# Patient Record
Sex: FEMALE | Race: WHITE | NOT HISPANIC OR LATINO | URBAN - METROPOLITAN AREA
[De-identification: names, ages, dates, MRNs, and addresses within clinical notes are randomized per-mention and may not be internally consistent; named-entity substitution may affect disease eponyms.]

---

## 2023-02-09 PROBLEM — R26.89 POOR BALANCE: Status: ACTIVE | Noted: 2023-02-09

## 2023-02-09 PROBLEM — R39.9 UTI SYMPTOMS: Status: ACTIVE | Noted: 2023-02-09

## 2023-02-09 PROBLEM — G47.33 OBSTRUCTIVE SLEEP APNEA OF ADULT: Status: ACTIVE | Noted: 2023-02-09

## 2023-02-09 PROBLEM — R06.83 SNORING: Status: ACTIVE | Noted: 2023-02-09

## 2023-02-09 PROBLEM — R20.2 TINGLING: Status: ACTIVE | Noted: 2023-02-09

## 2023-02-09 PROBLEM — R56.9 SEIZURES (MULTI): Status: ACTIVE | Noted: 2023-02-09

## 2023-02-09 PROBLEM — G24.01 TARDIVE DYSKINESIA: Status: ACTIVE | Noted: 2023-02-09

## 2023-02-09 PROBLEM — F32.0 DEPRESSION, MAJOR, SINGLE EPISODE, MILD (CMS-HCC): Status: ACTIVE | Noted: 2023-02-09

## 2023-02-09 PROBLEM — G47.30 SLEEP APNEA: Status: ACTIVE | Noted: 2023-02-09

## 2023-02-09 PROBLEM — F32.A DEPRESSION: Status: ACTIVE | Noted: 2023-02-09

## 2023-02-09 PROBLEM — G25.71 AKATHISIA: Status: ACTIVE | Noted: 2023-02-09

## 2023-02-09 PROBLEM — N32.81 OVERACTIVE BLADDER: Status: ACTIVE | Noted: 2023-02-09

## 2023-02-09 PROBLEM — R21 RASH: Status: ACTIVE | Noted: 2023-02-09

## 2023-02-09 PROBLEM — G25.9 MOVEMENT DISORDER: Status: ACTIVE | Noted: 2023-02-09

## 2023-02-09 PROBLEM — E11.9 TYPE 2 DIABETES MELLITUS (MULTI): Status: ACTIVE | Noted: 2023-02-09

## 2023-02-09 PROBLEM — I83.90 VARICOSE VEIN OF LEG: Status: ACTIVE | Noted: 2023-02-09

## 2023-02-09 PROBLEM — K59.09 CHRONIC CONSTIPATION: Status: ACTIVE | Noted: 2023-02-09

## 2023-02-09 PROBLEM — R60.9 PERIPHERAL EDEMA: Status: ACTIVE | Noted: 2023-02-09

## 2023-02-09 PROBLEM — R60.0 PERIPHERAL EDEMA: Status: ACTIVE | Noted: 2023-02-09

## 2023-02-09 PROBLEM — L03.119 CELLULITIS OF HAND: Status: ACTIVE | Noted: 2023-02-09

## 2023-02-09 PROBLEM — K02.9 PAIN DUE TO DENTAL CARIES: Status: ACTIVE | Noted: 2023-02-09

## 2023-02-09 PROBLEM — M10.9: Status: ACTIVE | Noted: 2023-02-09

## 2023-02-09 PROBLEM — G47.00 INSOMNIA: Status: ACTIVE | Noted: 2023-02-09

## 2023-02-09 PROBLEM — R92.8 ABNORMAL MAMMOGRAM: Status: ACTIVE | Noted: 2023-02-09

## 2023-02-09 PROBLEM — M25.579 ANKLE PAIN: Status: ACTIVE | Noted: 2023-02-09

## 2023-02-09 PROBLEM — G47.10 EXCESSIVE SLEEPINESS: Status: ACTIVE | Noted: 2023-02-09

## 2023-02-09 PROBLEM — I10 HTN (HYPERTENSION): Status: ACTIVE | Noted: 2023-02-09

## 2023-02-09 RX ORDER — SPIRONOLACTONE 25 MG/1
1 TABLET ORAL
COMMUNITY
Start: 2018-12-13 | End: 2023-08-17 | Stop reason: ALTCHOICE

## 2023-02-09 RX ORDER — FLUTICASONE PROPIONATE 50 MCG
2 SPRAY, SUSPENSION (ML) NASAL DAILY
COMMUNITY
Start: 2018-01-08

## 2023-02-09 RX ORDER — MELATONIN 3 MG
TABLET ORAL
COMMUNITY
End: 2023-09-07 | Stop reason: SDUPTHER

## 2023-02-09 RX ORDER — AMLODIPINE BESYLATE 5 MG/1
1 TABLET ORAL
COMMUNITY
Start: 2021-06-09 | End: 2023-09-07 | Stop reason: SDUPTHER

## 2023-02-09 RX ORDER — MULTIVITAMIN
TABLET ORAL
COMMUNITY

## 2023-02-09 RX ORDER — GLIMEPIRIDE 1 MG/1
TABLET ORAL 2 TIMES DAILY
COMMUNITY
Start: 2020-03-06 | End: 2023-05-15 | Stop reason: SDUPTHER

## 2023-02-09 RX ORDER — DICLOFENAC SODIUM 10 MG/G
GEL TOPICAL
COMMUNITY
End: 2023-10-18 | Stop reason: ALTCHOICE

## 2023-02-09 RX ORDER — LOSARTAN POTASSIUM 100 MG/1
50 TABLET ORAL DAILY
COMMUNITY
Start: 2022-01-21 | End: 2023-08-16 | Stop reason: SDUPTHER

## 2023-02-09 RX ORDER — BLOOD SUGAR DIAGNOSTIC
STRIP MISCELLANEOUS EVERY 12 HOURS
COMMUNITY
Start: 2020-03-06

## 2023-02-09 RX ORDER — LEVETIRACETAM 1000 MG/1
1 TABLET ORAL 2 TIMES DAILY
COMMUNITY
End: 2023-08-16 | Stop reason: ALTCHOICE

## 2023-02-09 RX ORDER — FUROSEMIDE 20 MG/1
1 TABLET ORAL DAILY
COMMUNITY
Start: 2022-04-28

## 2023-02-09 RX ORDER — CICLOPIROX 80 MG/ML
SOLUTION TOPICAL
COMMUNITY
End: 2023-10-18 | Stop reason: ALTCHOICE

## 2023-02-09 RX ORDER — OXYBUTYNIN CHLORIDE 10 MG/1
1 TABLET, EXTENDED RELEASE ORAL DAILY
COMMUNITY
Start: 2020-09-15 | End: 2023-03-23 | Stop reason: SDUPTHER

## 2023-02-09 RX ORDER — NYSTATIN 100000 U/G
CREAM TOPICAL
COMMUNITY
Start: 2020-02-14

## 2023-02-09 RX ORDER — METFORMIN HYDROCHLORIDE 1000 MG/1
1 TABLET ORAL 2 TIMES DAILY
COMMUNITY
End: 2023-05-22 | Stop reason: SDUPTHER

## 2023-02-09 RX ORDER — METOPROLOL TARTRATE 25 MG/1
1 TABLET, FILM COATED ORAL 2 TIMES DAILY
COMMUNITY
Start: 2022-01-21 | End: 2023-04-28 | Stop reason: SDUPTHER

## 2023-02-09 RX ORDER — BENAZEPRIL HYDROCHLORIDE 20 MG/1
1 TABLET ORAL 2 TIMES DAILY
COMMUNITY
Start: 2020-04-24 | End: 2023-04-03 | Stop reason: SDUPTHER

## 2023-02-09 RX ORDER — ALLOPURINOL 300 MG/1
1 TABLET ORAL
COMMUNITY
Start: 2018-10-22 | End: 2023-04-28 | Stop reason: SDUPTHER

## 2023-02-09 RX ORDER — BETAMETHASONE DIPROPIONATE 0.5 MG/G
CREAM TOPICAL
COMMUNITY
Start: 2021-07-12 | End: 2023-10-18 | Stop reason: ALTCHOICE

## 2023-02-09 RX ORDER — LAMOTRIGINE 150 MG/1
1 TABLET ORAL 2 TIMES DAILY
COMMUNITY
Start: 2018-10-15 | End: 2024-01-10 | Stop reason: ALTCHOICE

## 2023-02-09 RX ORDER — LEVOTHYROXINE SODIUM 25 UG/1
1 TABLET ORAL DAILY
COMMUNITY
End: 2024-04-04 | Stop reason: SDUPTHER

## 2023-02-09 RX ORDER — MELOXICAM 15 MG/1
TABLET ORAL
COMMUNITY
End: 2023-08-17 | Stop reason: SINTOL

## 2023-02-09 RX ORDER — ATORVASTATIN CALCIUM 20 MG/1
1 TABLET, FILM COATED ORAL DAILY
COMMUNITY
End: 2023-08-10 | Stop reason: SDUPTHER

## 2023-02-09 RX ORDER — POLYETHYLENE GLYCOL 3350 17 G/17G
POWDER, FOR SOLUTION ORAL
COMMUNITY
Start: 2019-09-09

## 2023-03-23 ENCOUNTER — OFFICE VISIT (OUTPATIENT)
Dept: PRIMARY CARE | Facility: CLINIC | Age: 73
End: 2023-03-23
Payer: MEDICARE

## 2023-03-23 VITALS
DIASTOLIC BLOOD PRESSURE: 84 MMHG | TEMPERATURE: 97.8 F | RESPIRATION RATE: 20 BRPM | WEIGHT: 203 LBS | OXYGEN SATURATION: 96 % | HEIGHT: 64 IN | BODY MASS INDEX: 34.66 KG/M2 | HEART RATE: 76 BPM | SYSTOLIC BLOOD PRESSURE: 134 MMHG

## 2023-03-23 DIAGNOSIS — Z00.00 MEDICARE ANNUAL WELLNESS VISIT, SUBSEQUENT: Primary | ICD-10-CM

## 2023-03-23 DIAGNOSIS — R56.9 SEIZURES (MULTI): ICD-10-CM

## 2023-03-23 DIAGNOSIS — Z12.31 ENCOUNTER FOR SCREENING MAMMOGRAM FOR MALIGNANT NEOPLASM OF BREAST: ICD-10-CM

## 2023-03-23 DIAGNOSIS — M10.9 GOUT, UNSPECIFIED CAUSE, UNSPECIFIED CHRONICITY, UNSPECIFIED SITE: ICD-10-CM

## 2023-03-23 DIAGNOSIS — R26.89 BALANCE DISORDER: ICD-10-CM

## 2023-03-23 DIAGNOSIS — E11.9 TYPE 2 DIABETES MELLITUS WITHOUT COMPLICATION, WITHOUT LONG-TERM CURRENT USE OF INSULIN (MULTI): ICD-10-CM

## 2023-03-23 DIAGNOSIS — N32.81 OVERACTIVE BLADDER: ICD-10-CM

## 2023-03-23 DIAGNOSIS — Z12.11 COLON CANCER SCREENING: ICD-10-CM

## 2023-03-23 DIAGNOSIS — F32.0 DEPRESSION, MAJOR, SINGLE EPISODE, MILD (CMS-HCC): ICD-10-CM

## 2023-03-23 DIAGNOSIS — G47.33 OBSTRUCTIVE SLEEP APNEA OF ADULT: ICD-10-CM

## 2023-03-23 PROCEDURE — 3075F SYST BP GE 130 - 139MM HG: CPT | Performed by: FAMILY MEDICINE

## 2023-03-23 PROCEDURE — 1170F FXNL STATUS ASSESSED: CPT | Performed by: FAMILY MEDICINE

## 2023-03-23 PROCEDURE — 1036F TOBACCO NON-USER: CPT | Performed by: FAMILY MEDICINE

## 2023-03-23 PROCEDURE — 4010F ACE/ARB THERAPY RXD/TAKEN: CPT | Performed by: FAMILY MEDICINE

## 2023-03-23 PROCEDURE — 3051F HG A1C>EQUAL 7.0%<8.0%: CPT | Performed by: FAMILY MEDICINE

## 2023-03-23 PROCEDURE — G0439 PPPS, SUBSEQ VISIT: HCPCS | Performed by: FAMILY MEDICINE

## 2023-03-23 PROCEDURE — 99214 OFFICE O/P EST MOD 30 MIN: CPT | Performed by: FAMILY MEDICINE

## 2023-03-23 PROCEDURE — 3079F DIAST BP 80-89 MM HG: CPT | Performed by: FAMILY MEDICINE

## 2023-03-23 PROCEDURE — 1159F MED LIST DOCD IN RCRD: CPT | Performed by: FAMILY MEDICINE

## 2023-03-23 RX ORDER — OXYBUTYNIN CHLORIDE 15 MG/1
15 TABLET, EXTENDED RELEASE ORAL DAILY
Qty: 30 TABLET | Refills: 11 | Status: SHIPPED | OUTPATIENT
Start: 2023-03-23 | End: 2023-04-28 | Stop reason: SDUPTHER

## 2023-03-23 RX ORDER — ACETAMINOPHEN 500 MG
TABLET ORAL 2 TIMES DAILY
COMMUNITY
End: 2023-10-18 | Stop reason: ALTCHOICE

## 2023-03-23 RX ORDER — CHOLECALCIFEROL (VITAMIN D3) 50 MCG
2000 TABLET ORAL
COMMUNITY
End: 2023-10-18 | Stop reason: ALTCHOICE

## 2023-03-23 RX ORDER — MULTIVITAMIN
1 TABLET ORAL DAILY
COMMUNITY

## 2023-03-23 RX ORDER — OMEGA-3 FATTY ACIDS 1000 MG
1 CAPSULE ORAL
COMMUNITY
End: 2023-10-18 | Stop reason: ALTCHOICE

## 2023-03-23 RX ORDER — ACETAMINOPHEN, DEXTROMETHORPHAN HBR, DOXYLAMINE SUCCINATE, PHENYLEPHRINE HCL 650; 20; 12.5; 1 MG/30ML; MG/30ML; MG/30ML; MG/30ML
SOLUTION ORAL
COMMUNITY
End: 2023-10-18 | Stop reason: ALTCHOICE

## 2023-03-23 ASSESSMENT — ENCOUNTER SYMPTOMS
FEVER: 0
ENDOCRINE NEGATIVE: 1
DIZZINESS: 0
RESPIRATORY NEGATIVE: 1
BACK PAIN: 1
OCCASIONAL FEELINGS OF UNSTEADINESS: 0
FREQUENCY: 1
MYALGIAS: 1
GASTROINTESTINAL NEGATIVE: 1
LOSS OF SENSATION IN FEET: 1
HEMATOLOGIC/LYMPHATIC NEGATIVE: 1
DIFFICULTY URINATING: 1
PSYCHIATRIC NEGATIVE: 1
DEPRESSION: 0
APPETITE CHANGE: 0
ARTHRALGIAS: 1
CARDIOVASCULAR NEGATIVE: 1
NEUROLOGICAL NEGATIVE: 1
EYES NEGATIVE: 1
ALLERGIC/IMMUNOLOGIC NEGATIVE: 1

## 2023-03-23 ASSESSMENT — ACTIVITIES OF DAILY LIVING (ADL)
MANAGING_FINANCES: INDEPENDENT
TAKING_MEDICATION: INDEPENDENT
DOING_HOUSEWORK: INDEPENDENT
BATHING: INDEPENDENT
GROCERY_SHOPPING: INDEPENDENT
DRESSING: INDEPENDENT

## 2023-03-23 ASSESSMENT — PATIENT HEALTH QUESTIONNAIRE - PHQ9
10. IF YOU CHECKED OFF ANY PROBLEMS, HOW DIFFICULT HAVE THESE PROBLEMS MADE IT FOR YOU TO DO YOUR WORK, TAKE CARE OF THINGS AT HOME, OR GET ALONG WITH OTHER PEOPLE: SOMEWHAT DIFFICULT
2. FEELING DOWN, DEPRESSED OR HOPELESS: SEVERAL DAYS
SUM OF ALL RESPONSES TO PHQ9 QUESTIONS 1 AND 2: 2
1. LITTLE INTEREST OR PLEASURE IN DOING THINGS: SEVERAL DAYS

## 2023-03-23 NOTE — PROGRESS NOTES
"Subjective   Reason for Visit: Aracely Benito is an 73 y.o. female here for a Medicare Wellness visit.     Past Medical, Surgical, and Family History reviewed and updated in chart.    Reviewed all medications by prescribing practitioner or clinical pharmacist (such as prescriptions, OTCs, herbal therapies and supplements) and documented in the medical record.    HPI    Patient Care Team:  Pablo Estrella DO as PCP - General  Pablo Estrella DO as PCP - [facility] Attributed Provider     Review of Systems   Constitutional:  Negative for appetite change and fever.   HENT: Negative.     Eyes: Negative.    Respiratory: Negative.     Cardiovascular: Negative.    Gastrointestinal: Negative.    Endocrine: Negative.    Genitourinary:  Positive for difficulty urinating and frequency.   Musculoskeletal:  Positive for arthralgias, back pain and myalgias.        Leg cramps   Skin: Negative.    Allergic/Immunologic: Negative.    Neurological: Negative.  Negative for dizziness.        Gait difficulty   Hematological: Negative.    Psychiatric/Behavioral: Negative.         Objective   Vitals:  /84 (BP Location: Left arm, Patient Position: Sitting)   Pulse 76   Temp 36.6 °C (97.8 °F)   Resp 20   Ht 1.613 m (5' 3.5\")   Wt 92.1 kg (203 lb)   SpO2 96%   BMI 35.40 kg/m²       Physical Exam  Vitals and nursing note reviewed.   Constitutional:       Appearance: Normal appearance.   HENT:      Head: Normocephalic and atraumatic.      Nose: Nose normal.      Mouth/Throat:      Pharynx: Oropharynx is clear.   Eyes:      Extraocular Movements: Extraocular movements intact.      Conjunctiva/sclera: Conjunctivae normal.      Pupils: Pupils are equal, round, and reactive to light.   Neck:      Vascular: No carotid bruit.   Cardiovascular:      Rate and Rhythm: Normal rate and regular rhythm.      Pulses: Normal pulses.      Heart sounds: Normal heart sounds.   Pulmonary:      Effort: Pulmonary effort is normal. No respiratory distress.      " Breath sounds: Normal breath sounds. No wheezing, rhonchi or rales.   Abdominal:      General: Abdomen is flat. Bowel sounds are normal. There is no distension.      Palpations: Abdomen is soft.      Tenderness: There is no abdominal tenderness.      Hernia: No hernia is present.   Musculoskeletal:         General: No swelling or tenderness. Normal range of motion.      Cervical back: Normal range of motion and neck supple. No tenderness.   Lymphadenopathy:      Cervical: No cervical adenopathy.   Skin:     General: Skin is warm and dry.      Capillary Refill: Capillary refill takes less than 2 seconds.   Neurological:      General: No focal deficit present.      Mental Status: She is alert and oriented to person, place, and time.      Cranial Nerves: No cranial nerve deficit.   Psychiatric:         Attention and Perception: Attention and perception normal.         Mood and Affect: Mood normal.         Behavior: Behavior normal.         Thought Content: Thought content normal.         Judgment: Judgment normal.         Assessment/Plan   1. Medicare annual wellness visit, subsequent      2. Depression, major, single episode, mild (CMS/HCC)      3. Type 2 diabetes mellitus without complication, without long-term current use of insulin (CMS/[facility])    - Albumin , Urine Random; Future  - CBC; Future  - Comprehensive Metabolic Panel; Future  - Hemoglobin A1C; Future  - Lipid Panel; Future  - Thyroid Stimulating Hormone; Future  - Thyroxine, Total; Future    4. Obstructive sleep apnea of adult      5. Gout, unspecified cause, unspecified chronicity, unspecified site    - Uric Acid; Future    6. Balance disorder    - Referral to Physical Therapy; Future    7. Encounter for screening mammogram for malignant neoplasm of breast    - BI mammo bilateral screening tomosynthesis; Future    8. Colon cancer screening    - Cologuard® colon cancer screening; Future  - Cologuard® colon cancer screening    9. Overactive bladder    -  oxybutynin XL (Ditropan-XL) 15 mg 24 hr tablet; Take 1 tablet (15 mg) by mouth once daily.  Dispense: 30 tablet; Refill: 11   Problem List Items Addressed This Visit    None

## 2023-04-03 ENCOUNTER — LAB (OUTPATIENT)
Dept: LAB | Facility: LAB | Age: 73
End: 2023-04-03
Payer: MEDICARE

## 2023-04-03 DIAGNOSIS — I10 HYPERTENSION, UNSPECIFIED TYPE: ICD-10-CM

## 2023-04-03 DIAGNOSIS — M10.9 GOUT, UNSPECIFIED CAUSE, UNSPECIFIED CHRONICITY, UNSPECIFIED SITE: ICD-10-CM

## 2023-04-03 DIAGNOSIS — E11.9 TYPE 2 DIABETES MELLITUS WITHOUT COMPLICATION, WITHOUT LONG-TERM CURRENT USE OF INSULIN (MULTI): ICD-10-CM

## 2023-04-03 LAB
ALANINE AMINOTRANSFERASE (SGPT) (U/L) IN SER/PLAS: 16 U/L (ref 7–45)
ALBUMIN (G/DL) IN SER/PLAS: 3.9 G/DL (ref 3.4–5)
ALKALINE PHOSPHATASE (U/L) IN SER/PLAS: 65 U/L (ref 33–136)
ANION GAP IN SER/PLAS: 12 MMOL/L (ref 10–20)
ASPARTATE AMINOTRANSFERASE (SGOT) (U/L) IN SER/PLAS: 15 U/L (ref 9–39)
BILIRUBIN TOTAL (MG/DL) IN SER/PLAS: 0.3 MG/DL (ref 0–1.2)
CALCIUM (MG/DL) IN SER/PLAS: 9.3 MG/DL (ref 8.6–10.3)
CARBON DIOXIDE, TOTAL (MMOL/L) IN SER/PLAS: 25 MMOL/L (ref 21–32)
CHLORIDE (MMOL/L) IN SER/PLAS: 108 MMOL/L (ref 98–107)
CHOLESTEROL (MG/DL) IN SER/PLAS: 162 MG/DL (ref 0–199)
CHOLESTEROL IN HDL (MG/DL) IN SER/PLAS: 34.7 MG/DL
CHOLESTEROL/HDL RATIO: 4.7
CREATININE (MG/DL) IN SER/PLAS: 1.89 MG/DL (ref 0.5–1.05)
ERYTHROCYTE DISTRIBUTION WIDTH (RATIO) BY AUTOMATED COUNT: 14.9 % (ref 11.5–14.5)
ERYTHROCYTE MEAN CORPUSCULAR HEMOGLOBIN CONCENTRATION (G/DL) BY AUTOMATED: 31.3 G/DL (ref 32–36)
ERYTHROCYTE MEAN CORPUSCULAR VOLUME (FL) BY AUTOMATED COUNT: 99 FL (ref 80–100)
ERYTHROCYTES (10*6/UL) IN BLOOD BY AUTOMATED COUNT: 3.23 X10E12/L (ref 4–5.2)
ESTIMATED AVERAGE GLUCOSE FOR HBA1C: 160 MG/DL
GFR FEMALE: 28 ML/MIN/1.73M2
GLUCOSE (MG/DL) IN SER/PLAS: 133 MG/DL (ref 74–99)
HEMATOCRIT (%) IN BLOOD BY AUTOMATED COUNT: 32 % (ref 36–46)
HEMOGLOBIN (G/DL) IN BLOOD: 10 G/DL (ref 12–16)
HEMOGLOBIN A1C/HEMOGLOBIN TOTAL IN BLOOD: 7.2 %
LDL: 56 MG/DL (ref 0–99)
LEUKOCYTES (10*3/UL) IN BLOOD BY AUTOMATED COUNT: 7.9 X10E9/L (ref 4.4–11.3)
NON HDL CHOLESTEROL: 127 MG/DL
PLATELETS (10*3/UL) IN BLOOD AUTOMATED COUNT: 210 X10E9/L (ref 150–450)
POTASSIUM (MMOL/L) IN SER/PLAS: 5.1 MMOL/L (ref 3.5–5.3)
PROTEIN TOTAL: 6 G/DL (ref 6.4–8.2)
SODIUM (MMOL/L) IN SER/PLAS: 140 MMOL/L (ref 136–145)
THYROTROPIN (MIU/L) IN SER/PLAS BY DETECTION LIMIT <= 0.05 MIU/L: 2.76 MIU/L (ref 0.44–3.98)
THYROXINE (T4) (UG/DL) IN SER/PLAS: 7.2 UG/DL (ref 4.5–11.1)
TRIGLYCERIDE (MG/DL) IN SER/PLAS: 357 MG/DL (ref 0–149)
URATE (MG/DL) IN SER/PLAS: 4.2 MG/DL (ref 2.3–6.7)
UREA NITROGEN (MG/DL) IN SER/PLAS: 47 MG/DL (ref 6–23)
VLDL: 71 MG/DL (ref 0–40)

## 2023-04-03 PROCEDURE — 85027 COMPLETE CBC AUTOMATED: CPT

## 2023-04-03 PROCEDURE — 80061 LIPID PANEL: CPT

## 2023-04-03 PROCEDURE — 83036 HEMOGLOBIN GLYCOSYLATED A1C: CPT

## 2023-04-03 PROCEDURE — 80053 COMPREHEN METABOLIC PANEL: CPT

## 2023-04-03 PROCEDURE — 36415 COLL VENOUS BLD VENIPUNCTURE: CPT

## 2023-04-03 PROCEDURE — 84550 ASSAY OF BLOOD/URIC ACID: CPT

## 2023-04-03 PROCEDURE — 84436 ASSAY OF TOTAL THYROXINE: CPT

## 2023-04-03 PROCEDURE — 84443 ASSAY THYROID STIM HORMONE: CPT

## 2023-04-03 RX ORDER — BENAZEPRIL HYDROCHLORIDE 20 MG/1
20 TABLET ORAL 2 TIMES DAILY
Qty: 60 TABLET | Refills: 3 | Status: SHIPPED | OUTPATIENT
Start: 2023-04-03 | End: 2023-08-16 | Stop reason: SINTOL

## 2023-04-03 NOTE — TELEPHONE ENCOUNTER
PT WOULD ALSO LIKE ORDER FOR MAMMOGRAM.  Requested Prescriptions     Pending Prescriptions Disp Refills    benazepril (Lotensin) 20 mg tablet 60 tablet 3     Sig: Take 1 tablet (20 mg) by mouth in the morning and 1 tablet (20 mg) before bedtime.

## 2023-04-03 NOTE — RESULT ENCOUNTER NOTE
call pt Their Triglycerides were high I would suggest OTC fish oil 1200mg 2 once a day to help lower triglycerides and boost HDL.

## 2023-04-04 NOTE — RESULT ENCOUNTER NOTE
Also her A1C went down to 7.2.   Her kidney function was a little elevated. This could have been exacerbated by her fasting. I want her to stay well hydrated

## 2023-04-05 ENCOUNTER — TELEPHONE (OUTPATIENT)
Dept: PRIMARY CARE | Facility: CLINIC | Age: 73
End: 2023-04-05
Payer: MEDICARE

## 2023-04-05 NOTE — TELEPHONE ENCOUNTER
----- Message from Pablo Estrella DO sent at 4/3/2023  9:00 PM EDT -----  Also her A1C went down to 7.2.   Her kidney function was a little elevated. This could have been exacerbated by her fasting. I want her to stay well hydrated

## 2023-04-05 NOTE — TELEPHONE ENCOUNTER
----- Message from Pablo Estrella DO sent at 4/3/2023  6:38 PM EDT -----  call pt Their Triglycerides were high I would suggest OTC fish oil 1200mg 2 once a day to help lower triglycerides and boost HDL.

## 2023-04-05 NOTE — TELEPHONE ENCOUNTER
----- Message from Pablo Estrella DO sent at 4/3/2023  6:15 PM EDT -----  Please call the patient regarding her abnormal result.  Her Iron was a little low I would suggest Iron 325 mg once daily

## 2023-04-17 DIAGNOSIS — R93.1 ABNORMAL ECHOCARDIOGRAM: Primary | ICD-10-CM

## 2023-04-18 ENCOUNTER — TELEPHONE (OUTPATIENT)
Dept: PRIMARY CARE | Facility: CLINIC | Age: 73
End: 2023-04-18
Payer: MEDICARE

## 2023-04-18 NOTE — TELEPHONE ENCOUNTER
Pt called and is requesting an order for a mammogram. She would also like a recommendation for a cardiologist

## 2023-04-20 ENCOUNTER — TELEPHONE (OUTPATIENT)
Dept: PRIMARY CARE | Facility: CLINIC | Age: 73
End: 2023-04-20
Payer: MEDICARE

## 2023-04-20 NOTE — TELEPHONE ENCOUNTER
----- Message from Pablo Estrella DO sent at 4/17/2023  1:00 PM EDT -----  Regarding: FW: cardiologist  Iput in a referral to Dr Cedeño  ----- Message -----  From: Tracey Lockett CMA  Sent: 4/5/2023   3:38 PM EDT  To: Pablo Estrella DO  Subject: cardiologist                                     Spoke with patient and she is aware of results. She has never seen a cardiologist, who do you recommend    ----- Message -----  From: Pablo Estrella DO  Sent: 4/3/2023   6:16 PM EDT  To: Tracey Lockett CMA    Please call the patient regarding her abnormal result.  Call pther venticle is not filling as effectively as it should and her mitral valve has some calcification. Has she seen cardiology in the past?

## 2023-04-28 DIAGNOSIS — M10.9 GOUT, UNSPECIFIED CAUSE, UNSPECIFIED CHRONICITY, UNSPECIFIED SITE: Primary | ICD-10-CM

## 2023-04-28 DIAGNOSIS — I10 HYPERTENSION, UNSPECIFIED TYPE: ICD-10-CM

## 2023-04-28 DIAGNOSIS — N32.81 OVERACTIVE BLADDER: ICD-10-CM

## 2023-04-28 RX ORDER — OXYBUTYNIN CHLORIDE 15 MG/1
15 TABLET, EXTENDED RELEASE ORAL DAILY
Qty: 30 TABLET | Refills: 3 | Status: SHIPPED | OUTPATIENT
Start: 2023-04-28 | End: 2023-08-26

## 2023-04-28 RX ORDER — METOPROLOL TARTRATE 25 MG/1
25 TABLET, FILM COATED ORAL 2 TIMES DAILY
Qty: 60 TABLET | Refills: 3 | Status: SHIPPED | OUTPATIENT
Start: 2023-04-28 | End: 2023-08-16 | Stop reason: ALTCHOICE

## 2023-04-28 RX ORDER — ALLOPURINOL 300 MG/1
300 TABLET ORAL
Qty: 30 TABLET | Refills: 3 | Status: SHIPPED | OUTPATIENT
Start: 2023-04-28 | End: 2023-08-17 | Stop reason: SDUPTHER

## 2023-04-28 NOTE — TELEPHONE ENCOUNTER
Requested Prescriptions     Pending Prescriptions Disp Refills    oxybutynin XL (Ditropan-XL) 15 mg 24 hr tablet 30 tablet 11     Sig: Take 1 tablet (15 mg) by mouth once daily.    metoprolol tartrate (Lopressor) 25 mg tablet 60 tablet 3     Sig: Take 1 tablet (25 mg) by mouth 2 times a day.    allopurinol (Zyloprim) 300 mg tablet 30 tablet 0     Sig: Take 1 tablet (300 mg) by mouth once every day.     FYI pop up came up for Allopurinol for kidney disease.

## 2023-05-04 LAB
ANION GAP IN SER/PLAS: 16 MMOL/L (ref 10–20)
CALCIUM (MG/DL) IN SER/PLAS: 9.5 MG/DL (ref 8.6–10.3)
CARBON DIOXIDE, TOTAL (MMOL/L) IN SER/PLAS: 22 MMOL/L (ref 21–32)
CHLORIDE (MMOL/L) IN SER/PLAS: 105 MMOL/L (ref 98–107)
CREATININE (MG/DL) IN SER/PLAS: 1.83 MG/DL (ref 0.5–1.05)
ERYTHROCYTE DISTRIBUTION WIDTH (RATIO) BY AUTOMATED COUNT: 14.5 % (ref 11.5–14.5)
ERYTHROCYTE MEAN CORPUSCULAR HEMOGLOBIN CONCENTRATION (G/DL) BY AUTOMATED: 30.6 G/DL (ref 32–36)
ERYTHROCYTE MEAN CORPUSCULAR VOLUME (FL) BY AUTOMATED COUNT: 97 FL (ref 80–100)
ERYTHROCYTES (10*6/UL) IN BLOOD BY AUTOMATED COUNT: 3.53 X10E12/L (ref 4–5.2)
GFR FEMALE: 29 ML/MIN/1.73M2
GLUCOSE (MG/DL) IN SER/PLAS: 140 MG/DL (ref 74–99)
HEMATOCRIT (%) IN BLOOD BY AUTOMATED COUNT: 34.3 % (ref 36–46)
HEMOGLOBIN (G/DL) IN BLOOD: 10.5 G/DL (ref 12–16)
LEUKOCYTES (10*3/UL) IN BLOOD BY AUTOMATED COUNT: 8.4 X10E9/L (ref 4.4–11.3)
NRBC (PER 100 WBCS) BY AUTOMATED COUNT: 0 /100 WBC (ref 0–0)
PLATELETS (10*3/UL) IN BLOOD AUTOMATED COUNT: 199 X10E9/L (ref 150–450)
POTASSIUM (MMOL/L) IN SER/PLAS: 4.8 MMOL/L (ref 3.5–5.3)
SODIUM (MMOL/L) IN SER/PLAS: 138 MMOL/L (ref 136–145)
UREA NITROGEN (MG/DL) IN SER/PLAS: 39 MG/DL (ref 6–23)

## 2023-05-15 ENCOUNTER — TELEPHONE (OUTPATIENT)
Dept: PRIMARY CARE | Facility: CLINIC | Age: 73
End: 2023-05-15
Payer: MEDICARE

## 2023-05-15 DIAGNOSIS — E11.9 TYPE 2 DIABETES MELLITUS WITHOUT COMPLICATION, WITHOUT LONG-TERM CURRENT USE OF INSULIN (MULTI): Primary | ICD-10-CM

## 2023-05-15 RX ORDER — GLIMEPIRIDE 1 MG/1
1 TABLET ORAL DAILY
Qty: 90 TABLET | Refills: 3 | Status: SHIPPED | OUTPATIENT
Start: 2023-05-15 | End: 2023-08-16 | Stop reason: SINTOL

## 2023-05-15 NOTE — TELEPHONE ENCOUNTER
Requested Prescriptions     Pending Prescriptions Disp Refills    glimepiride (Amaryl) 1 mg tablet 90 tablet 1     Sig: Take 1 tablet (1 mg) by mouth once daily.

## 2023-05-22 ENCOUNTER — TELEPHONE (OUTPATIENT)
Dept: PEDIATRICS | Facility: CLINIC | Age: 73
End: 2023-05-22
Payer: MEDICARE

## 2023-05-22 DIAGNOSIS — E11.9 TYPE 2 DIABETES MELLITUS WITHOUT COMPLICATION, WITHOUT LONG-TERM CURRENT USE OF INSULIN (MULTI): Primary | ICD-10-CM

## 2023-05-22 NOTE — TELEPHONE ENCOUNTER
Requested Prescriptions     Pending Prescriptions Disp Refills    metFORMIN (Glucophage) 1,000 mg tablet 180 tablet 2     Sig: Take 1 tablet (1,000 mg) by mouth 2 times a day.

## 2023-05-23 RX ORDER — METFORMIN HYDROCHLORIDE 1000 MG/1
1000 TABLET ORAL 2 TIMES DAILY
Qty: 180 TABLET | Refills: 3 | Status: SHIPPED | OUTPATIENT
Start: 2023-05-23 | End: 2023-08-16 | Stop reason: ALTCHOICE

## 2023-08-09 ENCOUNTER — TELEPHONE (OUTPATIENT)
Dept: PRIMARY CARE | Facility: CLINIC | Age: 73
End: 2023-08-09
Payer: MEDICARE

## 2023-08-09 ENCOUNTER — PATIENT OUTREACH (OUTPATIENT)
Dept: PRIMARY CARE | Facility: CLINIC | Age: 73
End: 2023-08-09
Payer: MEDICARE

## 2023-08-09 RX ORDER — CARVEDILOL 12.5 MG/1
12.5 TABLET ORAL
COMMUNITY
End: 2023-09-07 | Stop reason: SDUPTHER

## 2023-08-09 RX ORDER — HYDRALAZINE HYDROCHLORIDE 100 MG/1
100 TABLET, FILM COATED ORAL EVERY 8 HOURS
COMMUNITY

## 2023-08-09 NOTE — TELEPHONE ENCOUNTER
Kamini with [facility] home care calling. Wants to know if you will follow pts home care services after being discharged from hospital today.  [phone]  Elba from [facility] also calling ([phone]) They received skilled nursing and therapy orders. They want to confirm that you will continue to follow pt for future healthcare needs.

## 2023-08-09 NOTE — PROGRESS NOTES
Discharge Facility:[facility]  Discharge Diagnosis:hypertensive urgency and worsening kidney function > CKD4  Admission Date:8/1/2023  Discharge Date: 8/8/2023    PCP Appointment Date:NONE  Specialist Appointment Date: DR ELDRIDGE 8/25/2023  Hospital Encounter and Summary: Linked   See discharge assessment below for further details    Engagement  Call Start Time: 1044 (8/9/2023 10:43 AM)    Medications  Medications reviewed with patient/caregiver?: Yes (8/9/2023 10:43 AM)  Is the patient having any side effects they believe may be caused by any medication additions or changes?: No (8/9/2023 10:43 AM)  Does the patient have all medications ordered at discharge?: Yes (new meds: coreg, hydrazaline,losartan,miralx,dulcolax) (8/9/2023 10:43 AM)  Care Management Interventions: Advised patient to call provider (8/9/2023 10:43 AM)  Prescription Comments: patient spouse had many questions about meds. deferred to pcp (8/9/2023 10:43 AM)  Is the patient taking all medications as directed (includes completed medication regime)?: Yes (8/9/2023 10:43 AM)  Medication Comments: see med list (8/9/2023 10:43 AM)    Appointments  Does the patient have a primary care provider?: Yes (8/9/2023 10:43 AM)  Care Management Interventions: Advised patient to make appointment (will task office- no appts available) (8/9/2023 10:43 AM)  Has the patient kept scheduled appointments due by today?: Yes (8/9/2023 10:43 AM)  Care Management Interventions: Advised patient to keep appointment (8/9/2023 10:43 AM)    Self Management  What is the home health agency?: [facility] care (8/9/2023 10:43 AM)  Has home health visited the patient within 72 hours of discharge?: No (note states SOC within 48 hours of discharge.) (8/9/2023 10:43 AM)    Patient Teaching  Does the patient have access to their discharge instructions?: Yes (8/9/2023 10:43 AM)  Care Management Interventions: Reviewed instructions with patient (8/9/2023 10:43 AM)  What is the patient's  perception of their health status since discharge?: Same (8/9/2023 10:43 AM)  Is the patient/caregiver able to teach back the hierarchy of who to call/visit for symptoms/problems? PCP, Specialist, Home Health nurse, Urgent Care, ED, 911: Yes (8/9/2023 10:43 AM)    Wrap Up  Wrap Up Additional Comments: spoke with spouse Zeeshan. he stated that the patient was looking a little better today. spouse had many questions regarding medications. i advised to call pcp with those questions. he was waiting to hear from home health. patient was jsut discharged 8/8/2023. SOC is within 48 hours of discharge. home health has been in touch with pcp regarding following for orders. spouse asked if speech therapy was ordered with PT/OT. I did not see it in the discharge note. I advised spouse to speak with the nurse that comes from home health to do the evaluation. patient spouse has my contact info in any other needs arise. (8/9/2023 10:43 AM)  Call End Time: 1054 (8/9/2023 10:43 AM)

## 2023-08-10 DIAGNOSIS — I10 HYPERTENSION, UNSPECIFIED TYPE: Primary | ICD-10-CM

## 2023-08-10 RX ORDER — ATORVASTATIN CALCIUM 20 MG/1
20 TABLET, FILM COATED ORAL DAILY
Qty: 90 TABLET | Refills: 3 | Status: SHIPPED | OUTPATIENT
Start: 2023-08-10

## 2023-08-16 ENCOUNTER — OFFICE VISIT (OUTPATIENT)
Dept: PRIMARY CARE | Facility: CLINIC | Age: 73
End: 2023-08-16
Payer: MEDICARE

## 2023-08-16 ENCOUNTER — PATIENT OUTREACH (OUTPATIENT)
Dept: PRIMARY CARE | Facility: CLINIC | Age: 73
End: 2023-08-16

## 2023-08-16 VITALS
SYSTOLIC BLOOD PRESSURE: 122 MMHG | RESPIRATION RATE: 19 BRPM | WEIGHT: 187 LBS | BODY MASS INDEX: 31.12 KG/M2 | HEART RATE: 63 BPM | DIASTOLIC BLOOD PRESSURE: 62 MMHG | TEMPERATURE: 97.8 F

## 2023-08-16 DIAGNOSIS — M10.9 GOUT, UNSPECIFIED CAUSE, UNSPECIFIED CHRONICITY, UNSPECIFIED SITE: ICD-10-CM

## 2023-08-16 DIAGNOSIS — I10 PRIMARY HYPERTENSION: ICD-10-CM

## 2023-08-16 DIAGNOSIS — E11.9 TYPE 2 DIABETES MELLITUS WITHOUT COMPLICATION, WITHOUT LONG-TERM CURRENT USE OF INSULIN (MULTI): ICD-10-CM

## 2023-08-16 DIAGNOSIS — G47.33 OBSTRUCTIVE SLEEP APNEA OF ADULT: ICD-10-CM

## 2023-08-16 DIAGNOSIS — F51.01 PRIMARY INSOMNIA: ICD-10-CM

## 2023-08-16 DIAGNOSIS — E11.21 DIABETIC NEPHROPATHY ASSOCIATED WITH TYPE 2 DIABETES MELLITUS (MULTI): ICD-10-CM

## 2023-08-16 DIAGNOSIS — R60.9 PERIPHERAL EDEMA: ICD-10-CM

## 2023-08-16 DIAGNOSIS — I51.89 DIASTOLIC DYSFUNCTION: Primary | ICD-10-CM

## 2023-08-16 DIAGNOSIS — Z12.31 ENCOUNTER FOR SCREENING MAMMOGRAM FOR MALIGNANT NEOPLASM OF BREAST: ICD-10-CM

## 2023-08-16 PROCEDURE — 99214 OFFICE O/P EST MOD 30 MIN: CPT | Performed by: FAMILY MEDICINE

## 2023-08-16 RX ORDER — LOSARTAN POTASSIUM 100 MG/1
50 TABLET ORAL DAILY
Qty: 15 TABLET | Refills: 1
Start: 2023-08-16 | End: 2023-09-07 | Stop reason: SDUPTHER

## 2023-08-16 RX ORDER — TRAZODONE HYDROCHLORIDE 50 MG/1
2246400 TABLET ORAL NIGHTLY
COMMUNITY
End: 2023-08-17 | Stop reason: SDUPTHER

## 2023-08-16 ASSESSMENT — ENCOUNTER SYMPTOMS
APPETITE CHANGE: 0
POLYPHAGIA: 0
NAUSEA: 0
WEAKNESS: 0
MYALGIAS: 0
DIZZINESS: 0
SHORTNESS OF BREATH: 0
FREQUENCY: 0
ARTHRALGIAS: 0
FATIGUE: 0
VOMITING: 0
POLYDIPSIA: 0
DIARRHEA: 0
HEADACHES: 0
CHEST TIGHTNESS: 0
NUMBNESS: 0

## 2023-08-16 NOTE — PROGRESS NOTES
Subjective   Patient ID: Aracely Benito is a 73 y.o. female who presents for Follow-up (1 week follow up from hospital stay for elevated BP and chronic kidney disease. Pt states BP in the ER was 185/89. She states she has been feeling very tired and fatigue.  BP have been 130's/70's.  ).  HPI    Review of Systems   Constitutional:  Negative for appetite change and fatigue.   Eyes:  Negative for visual disturbance.   Respiratory:  Negative for chest tightness and shortness of breath.    Cardiovascular:  Negative for chest pain and leg swelling.   Gastrointestinal:  Negative for diarrhea, nausea and vomiting.   Endocrine: Negative for polydipsia, polyphagia and polyuria.   Genitourinary:  Negative for frequency and urgency.   Musculoskeletal:  Negative for arthralgias and myalgias.   Neurological:  Negative for dizziness, syncope, weakness, numbness and headaches.       Objective   Physical Exam  Constitutional:       Appearance: Normal appearance.   HENT:      Head: Normocephalic and atraumatic.      Mouth/Throat:      Mouth: Mucous membranes are moist.   Eyes:      Extraocular Movements: Extraocular movements intact.      Conjunctiva/sclera: Conjunctivae normal.      Pupils: Pupils are equal, round, and reactive to light.      Funduscopic exam:     Right eye: No hemorrhage or AV nicking.         Left eye: No hemorrhage or AV nicking.   Cardiovascular:      Rate and Rhythm: Normal rate and regular rhythm.      Pulses: Normal pulses.      Heart sounds: Normal heart sounds.   Pulmonary:      Effort: Pulmonary effort is normal.      Breath sounds: Normal breath sounds.   Abdominal:      General: Abdomen is flat. Bowel sounds are normal.      Palpations: Abdomen is soft.   Musculoskeletal:         General: Normal range of motion.      Cervical back: Neck supple.      Right lower le+ Edema present.      Left lower le+ Edema present.      Right foot: No swelling or Charcot foot.      Left foot: No swelling or  Charcot foot.   Skin:     General: Skin is warm and dry.      Findings: No wound.   Neurological:      General: No focal deficit present.      Mental Status: She is alert and oriented to person, place, and time.      Sensory: No sensory deficit.      Motor: No weakness.   Psychiatric:         Mood and Affect: Mood normal.         Assessment/Plan

## 2023-08-16 NOTE — PROGRESS NOTES
I presented the program to Aracely and her  during office visit with Dr. Estrella.  Zeeshan did not feel that he needed a CCM but Aracely asked if she could have my card.

## 2023-08-17 RX ORDER — TRAZODONE HYDROCHLORIDE 50 MG/1
50 TABLET ORAL NIGHTLY
Qty: 30 TABLET | Refills: 0
Start: 2023-08-17 | End: 2023-09-16

## 2023-08-17 RX ORDER — ALLOPURINOL 100 MG/1
100 TABLET ORAL
Qty: 30 TABLET | Refills: 3
Start: 2023-08-17 | End: 2023-09-07 | Stop reason: SDUPTHER

## 2023-08-23 ENCOUNTER — PATIENT OUTREACH (OUTPATIENT)
Dept: PRIMARY CARE | Facility: CLINIC | Age: 73
End: 2023-08-23
Payer: MEDICARE

## 2023-08-23 NOTE — PROGRESS NOTES
Call regarding appt. with PCP on 8/16/2023 after hospitalization.  At time of outreach call the patient feels as if their condition has improved since last visit. Patient states that she is still tired however getting better each day. Continuing with PT.  Reviewed the PCP appointment with the pt and addressed any questions or concerns.

## 2023-09-06 DIAGNOSIS — N32.81 OVERACTIVE BLADDER: ICD-10-CM

## 2023-09-06 DIAGNOSIS — I10 PRIMARY HYPERTENSION: ICD-10-CM

## 2023-09-06 DIAGNOSIS — I51.89 DIASTOLIC DYSFUNCTION: ICD-10-CM

## 2023-09-06 DIAGNOSIS — G47.10 EXCESSIVE SLEEPINESS: ICD-10-CM

## 2023-09-06 DIAGNOSIS — M10.9 GOUT, UNSPECIFIED CAUSE, UNSPECIFIED CHRONICITY, UNSPECIFIED SITE: ICD-10-CM

## 2023-09-06 DIAGNOSIS — G47.00 INSOMNIA, UNSPECIFIED TYPE: ICD-10-CM

## 2023-09-06 RX ORDER — OXYBUTYNIN CHLORIDE 15 MG/1
1 TABLET, EXTENDED RELEASE ORAL NIGHTLY
COMMUNITY
End: 2023-09-07 | Stop reason: SDUPTHER

## 2023-09-07 RX ORDER — ALLOPURINOL 100 MG/1
100 TABLET ORAL
Qty: 30 TABLET | Refills: 3 | Status: SHIPPED | OUTPATIENT
Start: 2023-09-07 | End: 2023-11-22

## 2023-09-07 RX ORDER — MELATONIN 3 MG
1 TABLET ORAL NIGHTLY
Qty: 90 TABLET | Refills: 3 | Status: SHIPPED | OUTPATIENT
Start: 2023-09-07 | End: 2023-09-11

## 2023-09-07 RX ORDER — ACETAMINOPHEN 500 MG
5000 TABLET ORAL
COMMUNITY
Start: 2023-08-09 | End: 2023-09-07 | Stop reason: SDUPTHER

## 2023-09-07 RX ORDER — CARVEDILOL 12.5 MG/1
12.5 TABLET ORAL
Qty: 180 TABLET | Refills: 3 | Status: SHIPPED | OUTPATIENT
Start: 2023-09-07 | End: 2024-04-04

## 2023-09-07 RX ORDER — OXYBUTYNIN CHLORIDE 15 MG/1
15 TABLET, EXTENDED RELEASE ORAL NIGHTLY
Qty: 90 TABLET | Refills: 3 | Status: SHIPPED | OUTPATIENT
Start: 2023-09-07 | End: 2024-01-10 | Stop reason: ALTCHOICE

## 2023-09-07 RX ORDER — ACETAMINOPHEN 500 MG
5000 TABLET ORAL
Qty: 30 TABLET | Refills: 3 | Status: SHIPPED | OUTPATIENT
Start: 2023-09-07 | End: 2023-10-18 | Stop reason: ALTCHOICE

## 2023-09-07 RX ORDER — AMLODIPINE BESYLATE 5 MG/1
10 TABLET ORAL
Qty: 180 TABLET | Refills: 3 | Status: SHIPPED | OUTPATIENT
Start: 2023-09-07

## 2023-09-07 RX ORDER — LOSARTAN POTASSIUM 100 MG/1
50 TABLET ORAL DAILY
Qty: 45 TABLET | Refills: 3 | Status: SHIPPED | OUTPATIENT
Start: 2023-09-07 | End: 2024-09-01

## 2023-09-07 NOTE — TELEPHONE ENCOUNTER
Patient requests prescription below    Last Office Visit: 8/16/2023     Requested Prescriptions     Pending Prescriptions Disp Refills    oxybutynin XL (Ditropan-XL) 15 mg 24 hr tablet 90 tablet 1     Sig: Take 1 tablet (15 mg) by mouth once daily at bedtime.    amLODIPine (Norvasc) 5 mg tablet 180 tablet 1     Sig: Take 2 tablets (10 mg) by mouth once every day.    losartan (Cozaar) 100 mg tablet 45 tablet 1     Sig: Take 0.5 tablets (50 mg) by mouth once daily.    allopurinol (Zyloprim) 100 mg tablet 30 tablet 3     Sig: Take 1 tablet (100 mg) by mouth once every day.    carvedilol (Coreg) 12.5 mg tablet 180 tablet 1     Sig: Take 1 tablet (12.5 mg) by mouth 2 times a day with meals.    melatonin-pyridoxine HCl, B6, 3-10 mg tablet 90 tablet 1     Sig: Take 1 tablet by mouth once daily at bedtime.    cholecalciferol (Vitamin D-3) 5,000 Units tablet 30 tablet 0     Sig: Take 1 tablet (5,000 Units) by mouth once daily.

## 2023-09-22 ENCOUNTER — PATIENT OUTREACH (OUTPATIENT)
Dept: PRIMARY CARE | Facility: CLINIC | Age: 73
End: 2023-09-22
Payer: MEDICARE

## 2023-09-22 NOTE — PROGRESS NOTES
Call placed regarding one month post discharge follow up call.  At time of outreach call the patient feels as if their condition has returned to base line since initial visit with PCP or specialist.  Questions or concerns regarding recovery period addressed at this time. Reviewed any PCP or specialists progress notes/labs/radiology reports if applicable and addressed any questions or concerns.

## 2023-09-29 ENCOUNTER — TELEPHONE (OUTPATIENT)
Dept: PRIMARY CARE | Facility: CLINIC | Age: 73
End: 2023-09-29
Payer: MEDICARE

## 2023-09-29 NOTE — TELEPHONE ENCOUNTER
Home health calling to get verbal for continuation of speech therapy focus on cognitive function (short term mem and executive function skills) 2 times a week for 1 week and 1 time a week for 1 week. TW out, will you ok  [phone]

## 2023-10-09 ENCOUNTER — APPOINTMENT (OUTPATIENT)
Dept: PRIMARY CARE | Facility: CLINIC | Age: 73
End: 2023-10-09
Payer: MEDICARE

## 2023-10-18 ENCOUNTER — OFFICE VISIT (OUTPATIENT)
Dept: PRIMARY CARE | Facility: CLINIC | Age: 73
End: 2023-10-18
Payer: MEDICARE

## 2023-10-18 VITALS
WEIGHT: 175 LBS | BODY MASS INDEX: 29.16 KG/M2 | RESPIRATION RATE: 19 BRPM | TEMPERATURE: 95.6 F | HEIGHT: 65 IN | SYSTOLIC BLOOD PRESSURE: 110 MMHG | HEART RATE: 66 BPM | DIASTOLIC BLOOD PRESSURE: 58 MMHG

## 2023-10-18 DIAGNOSIS — E11.9 TYPE 2 DIABETES MELLITUS WITHOUT COMPLICATION, WITHOUT LONG-TERM CURRENT USE OF INSULIN (MULTI): Primary | ICD-10-CM

## 2023-10-18 DIAGNOSIS — L84 PRE-ULCERATIVE CORN OR CALLOUS: ICD-10-CM

## 2023-10-18 DIAGNOSIS — I10 PRIMARY HYPERTENSION: ICD-10-CM

## 2023-10-18 DIAGNOSIS — Z23 IMMUNIZATION DUE: ICD-10-CM

## 2023-10-18 DIAGNOSIS — G47.33 OBSTRUCTIVE SLEEP APNEA OF ADULT: ICD-10-CM

## 2023-10-18 DIAGNOSIS — R56.9 SEIZURES (MULTI): ICD-10-CM

## 2023-10-18 DIAGNOSIS — F32.0 DEPRESSION, MAJOR, SINGLE EPISODE, MILD (CMS-HCC): ICD-10-CM

## 2023-10-18 PROCEDURE — 90662 IIV NO PRSV INCREASED AG IM: CPT | Performed by: FAMILY MEDICINE

## 2023-10-18 PROCEDURE — G0008 ADMIN INFLUENZA VIRUS VAC: HCPCS | Performed by: FAMILY MEDICINE

## 2023-10-18 PROCEDURE — 99214 OFFICE O/P EST MOD 30 MIN: CPT | Performed by: FAMILY MEDICINE

## 2023-10-18 PROCEDURE — G0009 ADMIN PNEUMOCOCCAL VACCINE: HCPCS | Performed by: FAMILY MEDICINE

## 2023-10-18 PROCEDURE — 90677 PCV20 VACCINE IM: CPT | Performed by: FAMILY MEDICINE

## 2023-10-18 RX ORDER — BISACODYL 10 MG/1
SUPPOSITORY RECTAL
COMMUNITY
Start: 2023-08-08

## 2023-10-18 RX ORDER — DAPAGLIFLOZIN 10 MG/1
10 TABLET, FILM COATED ORAL DAILY
Qty: 90 TABLET | Refills: 3
Start: 2023-10-18 | End: 2024-10-17

## 2023-10-18 RX ORDER — LAMOTRIGINE 150 MG/1
150 TABLET ORAL 2 TIMES DAILY
Qty: 180 TABLET | Refills: 3
Start: 2023-10-18 | End: 2024-10-17

## 2023-10-18 RX ORDER — BENAZEPRIL HYDROCHLORIDE 20 MG/1
1 TABLET ORAL 2 TIMES DAILY
COMMUNITY
Start: 2020-04-24 | End: 2023-10-18 | Stop reason: ALTCHOICE

## 2023-10-18 ASSESSMENT — ENCOUNTER SYMPTOMS
VOMITING: 0
SHORTNESS OF BREATH: 0
MYALGIAS: 1
NAUSEA: 0
DIZZINESS: 0
APPETITE CHANGE: 0
HEADACHES: 0
WOUND: 1
WEAKNESS: 1
FATIGUE: 0
FREQUENCY: 0
ARTHRALGIAS: 1
DIARRHEA: 0
POLYPHAGIA: 0
NUMBNESS: 1
CHEST TIGHTNESS: 0
POLYDIPSIA: 0

## 2023-10-18 NOTE — PROGRESS NOTES
"Subjective   Patient ID: Aracely Benito is a 73 y.o. female who presents for Med Refill (6 month med check, pt is concerned about pain in ribs, more on the right than left. ).    HPI     Review of Systems   Constitutional:  Negative for appetite change and fatigue.   Eyes:  Negative for visual disturbance.   Respiratory:  Negative for chest tightness and shortness of breath.    Cardiovascular:  Negative for chest pain and leg swelling.   Gastrointestinal:  Negative for diarrhea, nausea and vomiting.   Endocrine: Negative for polydipsia, polyphagia and polyuria.   Genitourinary:  Negative for frequency and urgency.   Musculoskeletal:  Positive for arthralgias and myalgias.   Skin:  Positive for wound.   Neurological:  Positive for weakness and numbness. Negative for dizziness, syncope and headaches.       Objective   /58   Pulse 66   Temp 35.3 °C (95.6 °F)   Resp 19   Ht 1.651 m (5' 5\")   Wt 79.4 kg (175 lb)   BMI 29.12 kg/m²     Physical Exam  Constitutional:       Appearance: Normal appearance.   HENT:      Head: Normocephalic and atraumatic.      Mouth/Throat:      Mouth: Mucous membranes are moist.   Eyes:      Extraocular Movements: Extraocular movements intact.      Conjunctiva/sclera: Conjunctivae normal.      Pupils: Pupils are equal, round, and reactive to light.      Funduscopic exam:     Right eye: No hemorrhage or AV nicking.         Left eye: No hemorrhage or AV nicking.   Cardiovascular:      Rate and Rhythm: Normal rate and regular rhythm.      Pulses: Normal pulses.      Heart sounds: Normal heart sounds.   Pulmonary:      Effort: Pulmonary effort is normal.      Breath sounds: Normal breath sounds.   Abdominal:      General: Abdomen is flat. Bowel sounds are normal.      Palpations: Abdomen is soft.   Musculoskeletal:         General: Normal range of motion.      Cervical back: Neck supple.      Right foot: No swelling or Charcot foot.      Left foot: No swelling or Charcot foot. "   Skin:     General: Skin is warm and dry.      Findings: No wound.             Comments: Pre ulcerative callous right big toe   Neurological:      General: No focal deficit present.      Mental Status: She is alert and oriented to person, place, and time.      Sensory: No sensory deficit.      Motor: No weakness.   Psychiatric:         Mood and Affect: Mood normal.         Assessment/Plan   Problem List Items Addressed This Visit             ICD-10-CM    Depression, major, single episode, mild (CMS/[facility]) F32.0    HTN (hypertension) I10    Obstructive sleep apnea of adult G47.33    Type 2 diabetes mellitus (CMS/[facility]) - Primary E11.9    Seizures (CMS/[facility]) R56.9

## 2023-10-26 ENCOUNTER — TELEPHONE (OUTPATIENT)
Dept: PRIMARY CARE | Facility: CLINIC | Age: 73
End: 2023-10-26
Payer: MEDICARE

## 2023-10-26 DIAGNOSIS — R39.9 UTI SYMPTOMS: Primary | ICD-10-CM

## 2023-10-26 RX ORDER — SULFAMETHOXAZOLE AND TRIMETHOPRIM 800; 160 MG/1; MG/1
1 TABLET ORAL 2 TIMES DAILY
Qty: 14 TABLET | Refills: 0 | Status: SHIPPED | OUTPATIENT
Start: 2023-10-26 | End: 2023-11-02

## 2023-10-26 NOTE — TELEPHONE ENCOUNTER
Patients  called in stating that his wife has developed a UTI, they checked with a urine strip.   Can we send in a Rx to [facility] in [place].

## 2023-11-08 ENCOUNTER — PATIENT OUTREACH (OUTPATIENT)
Dept: PRIMARY CARE | Facility: CLINIC | Age: 73
End: 2023-11-08
Payer: MEDICARE

## 2023-11-21 DIAGNOSIS — R39.9 UTI SYMPTOMS: Primary | ICD-10-CM

## 2023-11-21 DIAGNOSIS — M10.9 GOUT, UNSPECIFIED CAUSE, UNSPECIFIED CHRONICITY, UNSPECIFIED SITE: ICD-10-CM

## 2023-11-22 RX ORDER — ALLOPURINOL 100 MG/1
100 TABLET ORAL
Qty: 30 TABLET | Refills: 3 | Status: SHIPPED | OUTPATIENT
Start: 2023-11-22 | End: 2024-04-04

## 2023-11-30 RX ORDER — NITROFURANTOIN 25; 75 MG/1; MG/1
100 CAPSULE ORAL 2 TIMES DAILY
Qty: 14 CAPSULE | Refills: 0 | Status: SHIPPED | OUTPATIENT
Start: 2023-11-30 | End: 2023-12-07

## 2023-11-30 NOTE — TELEPHONE ENCOUNTER
Pt daughter called and they did an at home test for UTI and it did come back positive and she would like to know if RX can be sent in?

## 2023-12-08 ENCOUNTER — LAB (OUTPATIENT)
Dept: LAB | Facility: LAB | Age: 73
End: 2023-12-08
Payer: MEDICARE

## 2023-12-08 ENCOUNTER — OFFICE VISIT (OUTPATIENT)
Dept: PRIMARY CARE | Facility: CLINIC | Age: 73
End: 2023-12-08
Payer: MEDICARE

## 2023-12-08 VITALS
BODY MASS INDEX: 24.63 KG/M2 | WEIGHT: 148 LBS | RESPIRATION RATE: 19 BRPM | DIASTOLIC BLOOD PRESSURE: 62 MMHG | SYSTOLIC BLOOD PRESSURE: 118 MMHG | TEMPERATURE: 97.4 F | HEART RATE: 66 BPM

## 2023-12-08 DIAGNOSIS — R63.4 WEIGHT LOSS: ICD-10-CM

## 2023-12-08 DIAGNOSIS — R41.3 MEMORY LOSS: ICD-10-CM

## 2023-12-08 DIAGNOSIS — F41.9 ANXIETY: ICD-10-CM

## 2023-12-08 DIAGNOSIS — R53.83 FATIGUE, UNSPECIFIED TYPE: Primary | ICD-10-CM

## 2023-12-08 DIAGNOSIS — R56.9 SEIZURES (MULTI): ICD-10-CM

## 2023-12-08 DIAGNOSIS — I10 PRIMARY HYPERTENSION: ICD-10-CM

## 2023-12-08 DIAGNOSIS — R53.83 FATIGUE, UNSPECIFIED TYPE: ICD-10-CM

## 2023-12-08 LAB
ALBUMIN SERPL BCP-MCNC: 3.9 G/DL (ref 3.4–5)
ALP SERPL-CCNC: 50 U/L (ref 33–136)
ALT SERPL W P-5'-P-CCNC: 10 U/L (ref 7–45)
AMYLASE SERPL-CCNC: 56 U/L (ref 29–103)
ANION GAP SERPL CALC-SCNC: 17 MMOL/L (ref 10–20)
AST SERPL W P-5'-P-CCNC: 15 U/L (ref 9–39)
BASOPHILS # BLD AUTO: 0.04 X10*3/UL (ref 0–0.1)
BASOPHILS NFR BLD AUTO: 0.7 %
BILIRUB SERPL-MCNC: 0.3 MG/DL (ref 0–1.2)
BUN SERPL-MCNC: 54 MG/DL (ref 6–23)
CALCIUM SERPL-MCNC: 10.5 MG/DL (ref 8.6–10.3)
CHLORIDE SERPL-SCNC: 102 MMOL/L (ref 98–107)
CO2 SERPL-SCNC: 23 MMOL/L (ref 21–32)
CREAT SERPL-MCNC: 3.98 MG/DL (ref 0.5–1.05)
EOSINOPHIL # BLD AUTO: 0.48 X10*3/UL (ref 0–0.4)
EOSINOPHIL NFR BLD AUTO: 8 %
ERYTHROCYTE [DISTWIDTH] IN BLOOD BY AUTOMATED COUNT: 14.3 % (ref 11.5–14.5)
GFR SERPL CREATININE-BSD FRML MDRD: 11 ML/MIN/1.73M*2
GLUCOSE SERPL-MCNC: 97 MG/DL (ref 74–99)
HCT VFR BLD AUTO: 20.7 % (ref 36–46)
HGB BLD-MCNC: 6.6 G/DL (ref 12–16)
IMM GRANULOCYTES # BLD AUTO: 0.04 X10*3/UL (ref 0–0.5)
IMM GRANULOCYTES NFR BLD AUTO: 0.7 % (ref 0–0.9)
LIPASE SERPL-CCNC: 167 U/L (ref 9–82)
LYMPHOCYTES # BLD AUTO: 1.2 X10*3/UL (ref 0.8–3)
LYMPHOCYTES NFR BLD AUTO: 20.1 %
MCH RBC QN AUTO: 31.6 PG (ref 26–34)
MCHC RBC AUTO-ENTMCNC: 31.9 G/DL (ref 32–36)
MCV RBC AUTO: 99 FL (ref 80–100)
MONOCYTES # BLD AUTO: 0.51 X10*3/UL (ref 0.05–0.8)
MONOCYTES NFR BLD AUTO: 8.5 %
NEUTROPHILS # BLD AUTO: 3.7 X10*3/UL (ref 1.6–5.5)
NEUTROPHILS NFR BLD AUTO: 62 %
NRBC BLD-RTO: 0 /100 WBCS (ref 0–0)
PLATELET # BLD AUTO: 215 X10*3/UL (ref 150–450)
POTASSIUM SERPL-SCNC: 3.9 MMOL/L (ref 3.5–5.3)
PROT SERPL-MCNC: 5.9 G/DL (ref 6.4–8.2)
RBC # BLD AUTO: 2.09 X10*6/UL (ref 4–5.2)
SODIUM SERPL-SCNC: 138 MMOL/L (ref 136–145)
T4 FREE SERPL-MCNC: 1.31 NG/DL (ref 0.61–1.12)
TSH SERPL-ACNC: 1.94 MIU/L (ref 0.44–3.98)
WBC # BLD AUTO: 6 X10*3/UL (ref 4.4–11.3)

## 2023-12-08 PROCEDURE — 83690 ASSAY OF LIPASE: CPT

## 2023-12-08 PROCEDURE — 82150 ASSAY OF AMYLASE: CPT

## 2023-12-08 PROCEDURE — 85025 COMPLETE CBC W/AUTO DIFF WBC: CPT

## 2023-12-08 PROCEDURE — 80053 COMPREHEN METABOLIC PANEL: CPT

## 2023-12-08 PROCEDURE — 84439 ASSAY OF FREE THYROXINE: CPT

## 2023-12-08 PROCEDURE — 99214 OFFICE O/P EST MOD 30 MIN: CPT | Performed by: FAMILY MEDICINE

## 2023-12-08 PROCEDURE — 84443 ASSAY THYROID STIM HORMONE: CPT

## 2023-12-08 PROCEDURE — 36415 COLL VENOUS BLD VENIPUNCTURE: CPT

## 2023-12-08 RX ORDER — ESCITALOPRAM OXALATE 10 MG/1
10 TABLET ORAL DAILY
Qty: 30 TABLET | Refills: 5 | Status: SHIPPED | OUTPATIENT
Start: 2023-12-08 | End: 2024-06-05

## 2023-12-08 ASSESSMENT — PATIENT HEALTH QUESTIONNAIRE - PHQ9
SUM OF ALL RESPONSES TO PHQ9 QUESTIONS 1 AND 2: 2
1. LITTLE INTEREST OR PLEASURE IN DOING THINGS: SEVERAL DAYS
2. FEELING DOWN, DEPRESSED OR HOPELESS: SEVERAL DAYS
10. IF YOU CHECKED OFF ANY PROBLEMS, HOW DIFFICULT HAVE THESE PROBLEMS MADE IT FOR YOU TO DO YOUR WORK, TAKE CARE OF THINGS AT HOME, OR GET ALONG WITH OTHER PEOPLE: SOMEWHAT DIFFICULT

## 2023-12-08 NOTE — PROGRESS NOTES
Subjective   Patient ID: Aracely Benito is a 73 y.o. female who presents for Follow-up (Pt is here to discus confusion.  states he feels confusion has increased and is increasing daily.  ).    HPI     Review of Systems   Constitutional:  Positive for fatigue and unexpected weight change.   Neurological:  Positive for weakness and light-headedness.   Psychiatric/Behavioral:  Positive for confusion, decreased concentration and dysphoric mood.        Objective   /62   Pulse 66   Temp 36.3 °C (97.4 °F)   Resp 19   Wt 67.1 kg (148 lb)   BMI 24.63 kg/m²     Physical Exam  Constitutional:       Appearance: Normal appearance.   HENT:      Head: Normocephalic and atraumatic.      Mouth/Throat:      Mouth: Mucous membranes are moist.   Eyes:      Extraocular Movements: Extraocular movements intact.      Conjunctiva/sclera: Conjunctivae normal.      Pupils: Pupils are equal, round, and reactive to light.      Funduscopic exam:     Right eye: No hemorrhage or AV nicking.         Left eye: No hemorrhage or AV nicking.   Cardiovascular:      Rate and Rhythm: Normal rate and regular rhythm.      Pulses: Normal pulses.      Heart sounds: Normal heart sounds.   Pulmonary:      Effort: Pulmonary effort is normal.      Breath sounds: Normal breath sounds.   Abdominal:      General: Abdomen is flat. Bowel sounds are normal.      Palpations: Abdomen is soft.   Musculoskeletal:         General: Normal range of motion.      Cervical back: Neck supple.      Right foot: No swelling or Charcot foot.      Left foot: No swelling or Charcot foot.   Skin:     General: Skin is warm and dry.      Findings: No wound.   Neurological:      General: No focal deficit present.      Mental Status: She is alert. She is disoriented and confused.      Sensory: No sensory deficit.      Motor: Weakness present. No seizure activity.      Comments: Pt did poorly on MMSE   Psychiatric:         Mood and Affect: Mood is depressed. Affect is  flat.         Behavior: Behavior is slowed.         Cognition and Memory: Cognition is impaired. Memory is impaired.         Assessment/Plan   Problem List Items Addressed This Visit             ICD-10-CM    HTN (hypertension) I10    Seizures (CMS/HCC) R56.9     HCC reviewed follow up yearly          Other Visit Diagnoses         Codes    Fatigue, unspecified type    -  Primary R53.83    Relevant Orders    Comprehensive metabolic panel (Completed)    CBC and Auto Differential (Completed)    Amylase (Completed)    Lipase (Completed)    Thyroid Stimulating Hormone (Completed)    T4, free (Completed)    Ammonia    Weight loss     R63.4    Relevant Orders    Comprehensive metabolic panel (Completed)    CBC and Auto Differential (Completed)    Amylase (Completed)    Lipase (Completed)    Thyroid Stimulating Hormone (Completed)    T4, free (Completed)    Ammonia    Anxiety     F41.9    Relevant Medications    escitalopram (Lexapro) 10 mg tablet

## 2023-12-10 ASSESSMENT — ENCOUNTER SYMPTOMS
FATIGUE: 1
UNEXPECTED WEIGHT CHANGE: 1
DECREASED CONCENTRATION: 1
DYSPHORIC MOOD: 1
LIGHT-HEADEDNESS: 1
WEAKNESS: 1
CONFUSION: 1

## 2023-12-11 ENCOUNTER — TELEPHONE (OUTPATIENT)
Dept: PRIMARY CARE | Facility: CLINIC | Age: 73
End: 2023-12-11

## 2023-12-11 ENCOUNTER — APPOINTMENT (OUTPATIENT)
Dept: CARDIOLOGY | Facility: CLINIC | Age: 73
End: 2023-12-11
Payer: MEDICARE

## 2023-12-11 NOTE — TELEPHONE ENCOUNTER
I spoke with PT's daughter with her recent labs I advised ER. Hemoglobin of 6.6. worsening kidney function and elevated lipase.  Concern for GI bleed, renal failure, pancreatitis/tumor. Pt has fatigue,chest pain, shortness of breath, weight loss, nausea and vomiting.

## 2023-12-11 NOTE — TELEPHONE ENCOUNTER
"Esthela from  Lab called and stated that the lab ordered for Ammonia was cx because it was \" Lost in Transit\"    "

## 2023-12-19 ENCOUNTER — PATIENT OUTREACH (OUTPATIENT)
Dept: PRIMARY CARE | Facility: CLINIC | Age: 73
End: 2023-12-19
Payer: MEDICARE

## 2023-12-19 RX ORDER — SODIUM BICARBONATE 650 MG/1
650 TABLET ORAL 2 TIMES DAILY
COMMUNITY
End: 2024-01-18 | Stop reason: SDUPTHER

## 2023-12-19 RX ORDER — PANTOPRAZOLE SODIUM 40 MG/1
40 TABLET, DELAYED RELEASE ORAL
COMMUNITY
End: 2024-03-01 | Stop reason: SDUPTHER

## 2023-12-19 RX ORDER — FOLIC ACID 1 MG/1
1 TABLET ORAL DAILY
COMMUNITY
End: 2024-01-18 | Stop reason: SDUPTHER

## 2023-12-19 NOTE — PROGRESS NOTES
Discharge Facility: [facility]  Discharge Diagnosis: NGUYỄN  Admission Date: 12/11/2023  Discharge Date: 12/16/2023    PCP Appointment Date: 1/2/2024  Specialist Appointment Date: neurology 1/9/2024  Hospital Encounter and Summary: Linked   See discharge assessment below for further details    START taking these medications     folic acid 1 mg tablet  Take 1 tablet by mouth once daily.  Start taking on: December 17, 2023    pantoprazole DR 40 mg tablet  Commonly known as: PROTONIX  Take one tablet twice daily X 12 days, then once daily thereafter. Please take 30 minutes prior to any other medications or food intake.    psyllium 3.4 gram packet  Commonly known as: METAMUCIL  Take 1 Packet by mouth once daily.  Start taking on: December 17, 2023    sodium bicarbonate 650 mg tablet  Take 1 tablet by mouth two times a day.     CHANGE how you take these medications     losartan 50 mg tablet  Commonly known as: COZAAR  Take 1 tablet by mouth once daily. Hold until follow up with Nephrology  What changed: additional instructions     Engagement  Call Start Time: 1132 (12/19/2023 11:31 AM)    Medications  Medications reviewed with patient/caregiver?: Yes (12/19/2023 11:31 AM)  Is the patient having any side effects they believe may be caused by any medication additions or changes?: No (12/19/2023 11:31 AM)  Does the patient have all medications ordered at discharge?: Yes (12/19/2023 11:31 AM)  Care Management Interventions: No intervention needed (12/19/2023 11:31 AM)  Prescription Comments: new meds: folic acid, protonix, sodium bicarbinate, metamucil. change: cozaar (12/19/2023 11:31 AM)  Is the patient taking all medications as directed (includes completed medication regime)?: Yes (12/19/2023 11:31 AM)  Medication Comments: see med list (12/19/2023 11:31 AM)    Appointments  Does the patient have a primary care provider?: Yes (12/19/2023 11:31 AM)  Care Management Interventions: Verified appointment date/time/provider  (12/19/2023 11:31 AM)  Has the patient kept scheduled appointments due by today?: Yes (12/19/2023 11:31 AM)    Self Management  What is the home health agency?: none (12/19/2023 11:31 AM)  Has home health visited the patient within 72 hours of discharge?: Not applicable (12/19/2023 11:31 AM)    Patient Teaching  Does the patient have access to their discharge instructions?: Yes (12/19/2023 11:31 AM)  Care Management Interventions: Reviewed instructions with patient (12/19/2023 11:31 AM)  What is the patient's perception of their health status since discharge?: Improving (12/19/2023 11:31 AM)  Is the patient/caregiver able to teach back the hierarchy of who to call/visit for symptoms/problems? PCP, Specialist, Home Health nurse, Urgent Care, ED, 911: Yes (12/19/2023 11:31 AM)    Wrap Up  Wrap Up Additional Comments: CTS spoke with patient. She stated that she is doing tiara. still tired however the confusion is better. Trying to drink more water since she was dehydrated. She stated that she is limiting her salt intake and her DM numbers have been good.  She does have a hospital follow up with PCP scheduled. No questions or concerns at this time. (12/19/2023 11:31 AM)  Call End Time: 1135 (12/19/2023 11:31 AM)

## 2024-01-02 ENCOUNTER — TELEMEDICINE (OUTPATIENT)
Dept: PRIMARY CARE | Facility: CLINIC | Age: 74
End: 2024-01-02
Payer: MEDICARE

## 2024-01-02 DIAGNOSIS — K25.9 COMBINED GASTRIC AND DUODENAL ULCER, UNSPECIFIED ULCER CHRONICITY: ICD-10-CM

## 2024-01-02 DIAGNOSIS — N18.30 STAGE 3 CHRONIC KIDNEY DISEASE, UNSPECIFIED WHETHER STAGE 3A OR 3B CKD (MULTI): ICD-10-CM

## 2024-01-02 DIAGNOSIS — D50.0 IRON DEFICIENCY ANEMIA DUE TO CHRONIC BLOOD LOSS: Primary | ICD-10-CM

## 2024-01-02 DIAGNOSIS — K26.9 COMBINED GASTRIC AND DUODENAL ULCER, UNSPECIFIED ULCER CHRONICITY: ICD-10-CM

## 2024-01-02 DIAGNOSIS — E11.9 TYPE 2 DIABETES MELLITUS WITHOUT COMPLICATION, WITHOUT LONG-TERM CURRENT USE OF INSULIN (MULTI): ICD-10-CM

## 2024-01-02 PROCEDURE — 99213 OFFICE O/P EST LOW 20 MIN: CPT | Performed by: FAMILY MEDICINE

## 2024-01-02 ASSESSMENT — ENCOUNTER SYMPTOMS
DIARRHEA: 0
NUMBNESS: 0
DYSPHORIC MOOD: 1
POLYDIPSIA: 0
APPETITE CHANGE: 0
FREQUENCY: 0
CHEST TIGHTNESS: 0
WEAKNESS: 0
VOMITING: 0
POLYPHAGIA: 0
HEADACHES: 0
DIZZINESS: 0
NAUSEA: 0
SHORTNESS OF BREATH: 0
MYALGIAS: 0
FATIGUE: 1
DECREASED CONCENTRATION: 1
ARTHRALGIAS: 0

## 2024-01-02 NOTE — PROGRESS NOTES
Subjective   Patient ID: Aracely Benito is a 73 y.o. female who presents for No chief complaint on file..    HPI     Review of Systems   Constitutional:  Positive for fatigue. Negative for appetite change.   Eyes:  Negative for visual disturbance.   Respiratory:  Negative for chest tightness and shortness of breath.    Cardiovascular:  Negative for chest pain and leg swelling.   Gastrointestinal:  Negative for diarrhea, nausea and vomiting.   Endocrine: Negative for polydipsia, polyphagia and polyuria.   Genitourinary:  Negative for frequency and urgency.   Musculoskeletal:  Negative for arthralgias and myalgias.   Neurological:  Negative for dizziness, syncope, weakness, numbness and headaches.   Psychiatric/Behavioral:  Positive for decreased concentration and dysphoric mood.        Objective   There were no vitals taken for this visit.    Physical Exam  Pt's exam done via virtual appointment with audio and visual evaluation.   This visit was done via telemedicine/virtual visit. History,Review of systems taken and No physical exam unless documented in the chart by   video observation.    Assessment/Plan

## 2024-01-05 ENCOUNTER — PATIENT OUTREACH (OUTPATIENT)
Dept: PRIMARY CARE | Facility: CLINIC | Age: 74
End: 2024-01-05
Payer: MEDICARE

## 2024-01-05 NOTE — PROGRESS NOTES
Call regarding appt. with PCP on 1/2/2024 after hospitalization.  At time of outreach call the patient feels as if their condition has stayed about the same since last visit.  Reviewed the PCP appointment with the pt and addressed any questions or concerns.

## 2024-01-10 ENCOUNTER — LAB (OUTPATIENT)
Dept: LAB | Facility: LAB | Age: 74
End: 2024-01-10
Payer: MEDICARE

## 2024-01-10 ENCOUNTER — OFFICE VISIT (OUTPATIENT)
Dept: PRIMARY CARE | Facility: CLINIC | Age: 74
End: 2024-01-10
Payer: MEDICARE

## 2024-01-10 ENCOUNTER — TELEPHONE (OUTPATIENT)
Dept: PRIMARY CARE | Facility: CLINIC | Age: 74
End: 2024-01-10

## 2024-01-10 VITALS
TEMPERATURE: 97.6 F | WEIGHT: 157 LBS | BODY MASS INDEX: 26.13 KG/M2 | SYSTOLIC BLOOD PRESSURE: 118 MMHG | DIASTOLIC BLOOD PRESSURE: 64 MMHG | HEART RATE: 60 BPM | RESPIRATION RATE: 17 BRPM

## 2024-01-10 DIAGNOSIS — E11.9 TYPE 2 DIABETES MELLITUS WITHOUT COMPLICATION, WITHOUT LONG-TERM CURRENT USE OF INSULIN (MULTI): ICD-10-CM

## 2024-01-10 DIAGNOSIS — D64.9 ANEMIA, UNSPECIFIED TYPE: ICD-10-CM

## 2024-01-10 DIAGNOSIS — I10 PRIMARY HYPERTENSION: ICD-10-CM

## 2024-01-10 DIAGNOSIS — N18.30 STAGE 3 CHRONIC KIDNEY DISEASE, UNSPECIFIED WHETHER STAGE 3A OR 3B CKD (MULTI): ICD-10-CM

## 2024-01-10 DIAGNOSIS — K55.9: ICD-10-CM

## 2024-01-10 DIAGNOSIS — I50.9 CONGESTIVE HEART FAILURE, UNSPECIFIED HF CHRONICITY, UNSPECIFIED HEART FAILURE TYPE (MULTI): ICD-10-CM

## 2024-01-10 DIAGNOSIS — F32.0 DEPRESSION, MAJOR, SINGLE EPISODE, MILD (CMS-HCC): ICD-10-CM

## 2024-01-10 DIAGNOSIS — R56.9 SEIZURES (MULTI): ICD-10-CM

## 2024-01-10 DIAGNOSIS — K28.4 BLEEDING ULCER: Primary | ICD-10-CM

## 2024-01-10 LAB
ALBUMIN SERPL BCP-MCNC: 4 G/DL (ref 3.4–5)
ALP SERPL-CCNC: 53 U/L (ref 33–136)
ALT SERPL W P-5'-P-CCNC: 12 U/L (ref 7–45)
ANION GAP SERPL CALC-SCNC: 13 MMOL/L (ref 10–20)
AST SERPL W P-5'-P-CCNC: 18 U/L (ref 9–39)
BASOPHILS # BLD AUTO: 0.04 X10*3/UL (ref 0–0.1)
BASOPHILS NFR BLD AUTO: 0.5 %
BILIRUB SERPL-MCNC: 0.3 MG/DL (ref 0–1.2)
BUN SERPL-MCNC: 59 MG/DL (ref 6–23)
CALCIUM SERPL-MCNC: 9.8 MG/DL (ref 8.6–10.3)
CHLORIDE SERPL-SCNC: 95 MMOL/L (ref 98–107)
CO2 SERPL-SCNC: 28 MMOL/L (ref 21–32)
CREAT SERPL-MCNC: 3.13 MG/DL (ref 0.5–1.05)
EGFRCR SERPLBLD CKD-EPI 2021: 15 ML/MIN/1.73M*2
EOSINOPHIL # BLD AUTO: 0.58 X10*3/UL (ref 0–0.4)
EOSINOPHIL NFR BLD AUTO: 6.8 %
ERYTHROCYTE [DISTWIDTH] IN BLOOD BY AUTOMATED COUNT: 14.2 % (ref 11.5–14.5)
GLUCOSE SERPL-MCNC: 122 MG/DL (ref 74–99)
HCT VFR BLD AUTO: 25.8 % (ref 36–46)
HGB BLD-MCNC: 8.3 G/DL (ref 12–16)
IMM GRANULOCYTES # BLD AUTO: 0.02 X10*3/UL (ref 0–0.5)
IMM GRANULOCYTES NFR BLD AUTO: 0.2 % (ref 0–0.9)
LYMPHOCYTES # BLD AUTO: 1.53 X10*3/UL (ref 0.8–3)
LYMPHOCYTES NFR BLD AUTO: 17.8 %
MCH RBC QN AUTO: 31.1 PG (ref 26–34)
MCHC RBC AUTO-ENTMCNC: 32.2 G/DL (ref 32–36)
MCV RBC AUTO: 97 FL (ref 80–100)
MONOCYTES # BLD AUTO: 0.81 X10*3/UL (ref 0.05–0.8)
MONOCYTES NFR BLD AUTO: 9.4 %
NEUTROPHILS # BLD AUTO: 5.61 X10*3/UL (ref 1.6–5.5)
NEUTROPHILS NFR BLD AUTO: 65.3 %
NRBC BLD-RTO: 0 /100 WBCS (ref 0–0)
PLATELET # BLD AUTO: 216 X10*3/UL (ref 150–450)
POTASSIUM SERPL-SCNC: 4.1 MMOL/L (ref 3.5–5.3)
PROT SERPL-MCNC: 6.5 G/DL (ref 6.4–8.2)
RBC # BLD AUTO: 2.67 X10*6/UL (ref 4–5.2)
SODIUM SERPL-SCNC: 132 MMOL/L (ref 136–145)
WBC # BLD AUTO: 8.6 X10*3/UL (ref 4.4–11.3)

## 2024-01-10 PROCEDURE — 85025 COMPLETE CBC W/AUTO DIFF WBC: CPT

## 2024-01-10 PROCEDURE — 99213 OFFICE O/P EST LOW 20 MIN: CPT | Performed by: FAMILY MEDICINE

## 2024-01-10 PROCEDURE — 36415 COLL VENOUS BLD VENIPUNCTURE: CPT

## 2024-01-10 PROCEDURE — 80053 COMPREHEN METABOLIC PANEL: CPT

## 2024-01-10 ASSESSMENT — ENCOUNTER SYMPTOMS
SHORTNESS OF BREATH: 0
VOMITING: 0
WEAKNESS: 0
NUMBNESS: 0
ABDOMINAL DISTENTION: 0
CHEST TIGHTNESS: 0
MYALGIAS: 0
NAUSEA: 0
DIARRHEA: 0
POLYPHAGIA: 0
ABDOMINAL PAIN: 0
CONSTIPATION: 0
ANAL BLEEDING: 0
FATIGUE: 1
POLYDIPSIA: 0
HEADACHES: 0
FREQUENCY: 0
DIZZINESS: 0
ARTHRALGIAS: 0
BLOOD IN STOOL: 0
APPETITE CHANGE: 0

## 2024-01-10 NOTE — TELEPHONE ENCOUNTER
----- Message from Pablo Estrella DO sent at 1/9/2024  4:43 PM EST -----  See nmessage  ----- Message -----  From: Pablo Estrella DO  Sent: 1/9/2024  12:00 AM EST  To: Pablo Estrella DO    Did blood sugar come down with stopping glucose control boost       Focus note:    Pt transferring out of ICU today, chart reviewed, verbal sign out given by ICU staff, per sign out:   Patient came in for nausea, vomiting, SOB. Had multifocal pneumonia and MARY. He has a history of ESRD with renal transplant, has antibody mediated rejection.   For bronchoscopy today    - hospitalist is taking over patient's care at this time and will see the patient tomorrow    Jennifer Burkett MD

## 2024-01-10 NOTE — PROGRESS NOTES
Subjective   Patient ID: Aracely Benito is a 73 y.o. female who presents for Med Refill (3 month med check).  HPI    Review of Systems   Constitutional:  Positive for fatigue. Negative for appetite change.   Eyes:  Negative for visual disturbance.   Respiratory:  Negative for chest tightness and shortness of breath.    Cardiovascular:  Negative for chest pain and leg swelling.   Gastrointestinal:  Negative for abdominal distention, abdominal pain, anal bleeding, blood in stool, constipation, diarrhea, nausea and vomiting.   Endocrine: Negative for polydipsia, polyphagia and polyuria.   Genitourinary:  Negative for frequency and urgency.   Musculoskeletal:  Negative for arthralgias and myalgias.   Neurological:  Negative for dizziness, syncope, weakness, numbness and headaches.       Objective   Physical Exam  Constitutional:       Appearance: Normal appearance.   HENT:      Head: Normocephalic and atraumatic.      Mouth/Throat:      Mouth: Mucous membranes are moist.   Eyes:      Extraocular Movements: Extraocular movements intact.      Conjunctiva/sclera: Conjunctivae normal.      Pupils: Pupils are equal, round, and reactive to light.      Funduscopic exam:     Right eye: No hemorrhage or AV nicking.         Left eye: No hemorrhage or AV nicking.   Cardiovascular:      Rate and Rhythm: Normal rate and regular rhythm.      Pulses: Normal pulses.      Heart sounds: Normal heart sounds.   Pulmonary:      Effort: Pulmonary effort is normal.      Breath sounds: Normal breath sounds.   Abdominal:      General: Abdomen is flat. Bowel sounds are normal.      Palpations: Abdomen is soft.   Musculoskeletal:         General: Normal range of motion.      Cervical back: Neck supple.      Right foot: No swelling or Charcot foot.      Left foot: No swelling or Charcot foot.   Skin:     General: Skin is warm and dry.      Findings: No wound.   Neurological:      General: No focal deficit present.      Mental Status: She is  alert and oriented to person, place, and time.      Sensory: No sensory deficit.      Motor: No weakness.   Psychiatric:         Mood and Affect: Mood normal.         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

## 2024-01-18 DIAGNOSIS — Z79.899 MEDICATION MANAGEMENT: ICD-10-CM

## 2024-01-18 RX ORDER — FOLIC ACID 1 MG/1
1 TABLET ORAL DAILY
Qty: 90 TABLET | Refills: 3 | Status: SHIPPED | OUTPATIENT
Start: 2024-01-18

## 2024-01-18 RX ORDER — SODIUM BICARBONATE 650 MG/1
650 TABLET ORAL 2 TIMES DAILY
Qty: 180 TABLET | Refills: 3 | Status: SHIPPED | OUTPATIENT
Start: 2024-01-18

## 2024-01-18 NOTE — TELEPHONE ENCOUNTER
Patient requests prescription below    Last Office Visit: 1/10/2024     Requested Prescriptions     Pending Prescriptions Disp Refills    folic acid (Folvite) 1 mg tablet 90 tablet 0     Sig: Take 1 tablet (1 mg) by mouth once daily.    sodium bicarbonate 650 mg tablet 180 tablet 0     Sig: Take 1 tablet (650 mg) by mouth 2 times a day.        Self

## 2024-02-06 ENCOUNTER — PATIENT OUTREACH (OUTPATIENT)
Dept: PRIMARY CARE | Facility: CLINIC | Age: 74
End: 2024-02-06
Payer: MEDICARE

## 2024-02-06 NOTE — PROGRESS NOTES
Unable to reach patient for one month post discharge follow up call.   LVM with call back number for patient to call if needed

## 2024-02-21 ENCOUNTER — APPOINTMENT (OUTPATIENT)
Dept: PRIMARY CARE | Facility: CLINIC | Age: 74
End: 2024-02-21
Payer: MEDICARE

## 2024-03-01 ENCOUNTER — OFFICE VISIT (OUTPATIENT)
Dept: PRIMARY CARE | Facility: CLINIC | Age: 74
End: 2024-03-01
Payer: MEDICARE

## 2024-03-01 VITALS
SYSTOLIC BLOOD PRESSURE: 126 MMHG | RESPIRATION RATE: 18 BRPM | WEIGHT: 160 LBS | HEIGHT: 64 IN | TEMPERATURE: 97.9 F | HEART RATE: 70 BPM | BODY MASS INDEX: 27.31 KG/M2 | DIASTOLIC BLOOD PRESSURE: 60 MMHG

## 2024-03-01 DIAGNOSIS — K59.00 CONSTIPATION, UNSPECIFIED CONSTIPATION TYPE: ICD-10-CM

## 2024-03-01 DIAGNOSIS — M54.9 BACK PAIN, UNSPECIFIED BACK LOCATION, UNSPECIFIED BACK PAIN LATERALITY, UNSPECIFIED CHRONICITY: ICD-10-CM

## 2024-03-01 DIAGNOSIS — D64.9 ANEMIA, UNSPECIFIED TYPE: ICD-10-CM

## 2024-03-01 DIAGNOSIS — L73.9 NASAL FOLLICULITIS: Primary | ICD-10-CM

## 2024-03-01 DIAGNOSIS — K21.9 GASTROESOPHAGEAL REFLUX DISEASE, UNSPECIFIED WHETHER ESOPHAGITIS PRESENT: ICD-10-CM

## 2024-03-01 PROCEDURE — 99213 OFFICE O/P EST LOW 20 MIN: CPT | Performed by: FAMILY MEDICINE

## 2024-03-01 RX ORDER — BACLOFEN 10 MG/1
10 TABLET ORAL NIGHTLY
Qty: 90 TABLET | Refills: 3 | Status: SHIPPED | OUTPATIENT
Start: 2024-03-01 | End: 2025-03-01

## 2024-03-01 RX ORDER — PANTOPRAZOLE SODIUM 40 MG/1
40 TABLET, DELAYED RELEASE ORAL
Qty: 90 TABLET | Refills: 3 | Status: SHIPPED | OUTPATIENT
Start: 2024-03-01 | End: 2025-03-01

## 2024-03-01 RX ORDER — DARBEPOETIN ALFA 40 UG/.4ML
100 INJECTION, SOLUTION INTRAVENOUS; SUBCUTANEOUS
COMMUNITY

## 2024-03-01 RX ORDER — FERROUS SULFATE 325(65) MG
325 TABLET, DELAYED RELEASE (ENTERIC COATED) ORAL
Qty: 90 TABLET | Refills: 3 | Status: SHIPPED | OUTPATIENT
Start: 2024-03-01 | End: 2025-03-01

## 2024-03-01 RX ORDER — MUPIROCIN 20 MG/G
OINTMENT TOPICAL 3 TIMES DAILY
Qty: 22 G | Refills: 0 | Status: SHIPPED | OUTPATIENT
Start: 2024-03-01 | End: 2024-03-11

## 2024-03-01 RX ORDER — DOCUSATE SODIUM 100 MG/1
100 CAPSULE, LIQUID FILLED ORAL 2 TIMES DAILY
Qty: 180 CAPSULE | Refills: 3 | Status: SHIPPED | OUTPATIENT
Start: 2024-03-01 | End: 2025-03-01

## 2024-03-01 ASSESSMENT — ENCOUNTER SYMPTOMS
DIARRHEA: 0
WEAKNESS: 0
FATIGUE: 1
HEADACHES: 0
ARTHRALGIAS: 1
SHORTNESS OF BREATH: 0
NUMBNESS: 0
CHEST TIGHTNESS: 0
APPETITE CHANGE: 0
MYALGIAS: 0
POLYPHAGIA: 0
FREQUENCY: 0
NAUSEA: 0
POLYDIPSIA: 0
VOMITING: 0
DIZZINESS: 0

## 2024-03-01 NOTE — PROGRESS NOTES
Subjective   Patient ID: Aracely Benito is a 73 y.o. female who presents for Follow-up (Pt is here for follow up to discus endoscopy she had done and discus labs for kidney function. She states she has been doing ok. ).  HPI    Review of Systems   Constitutional:  Positive for fatigue. Negative for appetite change.   HENT:          Nostril soreness   Eyes:  Negative for visual disturbance.   Respiratory:  Negative for chest tightness and shortness of breath.    Cardiovascular:  Negative for chest pain and leg swelling.   Gastrointestinal:  Negative for diarrhea, nausea and vomiting.   Endocrine: Negative for polydipsia, polyphagia and polyuria.   Genitourinary:  Negative for frequency and urgency.   Musculoskeletal:  Positive for arthralgias. Negative for myalgias.   Neurological:  Negative for dizziness, syncope, weakness, numbness and headaches.       Objective   Physical Exam  Constitutional:       Appearance: Normal appearance.   HENT:      Head: Normocephalic and atraumatic.      Nose:        Comments: Nostril soreness with palpation     Mouth/Throat:      Mouth: Mucous membranes are moist.   Eyes:      Extraocular Movements: Extraocular movements intact.      Conjunctiva/sclera: Conjunctivae normal.      Pupils: Pupils are equal, round, and reactive to light.   Cardiovascular:      Rate and Rhythm: Normal rate and regular rhythm.      Pulses: Normal pulses.      Heart sounds: Normal heart sounds.   Pulmonary:      Effort: Pulmonary effort is normal.      Breath sounds: Normal breath sounds.   Abdominal:      General: Abdomen is flat. Bowel sounds are normal.      Palpations: Abdomen is soft.   Musculoskeletal:         General: Normal range of motion.      Cervical back: Neck supple.      Right knee: Tenderness present over the MCL and LCL.      Right foot: No swelling or Charcot foot.      Left foot: No swelling or Charcot foot.   Skin:     General: Skin is warm and dry.      Findings: No wound.    Neurological:      General: No focal deficit present.      Mental Status: She is alert and oriented to person, place, and time.      Sensory: No sensory deficit.      Motor: No weakness.   Psychiatric:         Mood and Affect: Mood normal.         Assessment/Plan   Problem List Items Addressed This Visit    None  Visit Diagnoses         Codes    Nasal folliculitis    -  Primary L73.9    Relevant Medications    mupirocin (Bactroban) 2 % ointment    Back pain, unspecified back location, unspecified back pain laterality, unspecified chronicity     M54.9    Relevant Medications    baclofen (Lioresal) 10 mg tablet    Gastroesophageal reflux disease, unspecified whether esophagitis present     K21.9    Relevant Medications    pantoprazole (ProtoNix) 40 mg EC tablet    Constipation, unspecified constipation type     K59.00    Relevant Medications    docusate sodium (Colace) 100 mg capsule    Anemia, unspecified type     D64.9    Relevant Medications    ferrous sulfate 325 (65 Fe) MG EC tablet                 Tracey Lockett, STEPHENIE 03/01/24 10:17 AM

## 2024-03-07 ENCOUNTER — PATIENT OUTREACH (OUTPATIENT)
Dept: PRIMARY CARE | Facility: CLINIC | Age: 74
End: 2024-03-07
Payer: MEDICARE

## 2024-05-07 ENCOUNTER — PATIENT OUTREACH (OUTPATIENT)
Dept: PRIMARY CARE | Facility: CLINIC | Age: 74
End: 2024-05-07
Payer: MEDICARE

## 2024-05-07 NOTE — PROGRESS NOTES
Discharge Facility: [facility] [place]  Discharge Diagnosis: Encephalopathy   Admission Date: 4/29/2024  Discharge Date: 5/3/2024    PCP Appointment Date: 5/9/2024  Specialist Appointment Date:   -hematology 5/7/2024    Hospital Encounter and Summary: Linked    FOLLOW UP APPOINTMENTS:   Future Appointments   Date Time Provider Department Center   5/7/2024 2:40 PM Letty Young MD HEMAVN REJ   5/7/2024 3:15 PM INJECTION HEMA [facility] REJ [facility] RE[facility]   [facility]   8/28/2024 2:00 PM Morgan Go MD GERILOLGA [facility] Nell   10/2/2024 2:45 PM Ramon Rubio MD [facility]     START taking these medications     amoxicillin-clavulanate potassium 500-125 mg per tablet  Commonly known as: AUGMENTIN  Take 1 tablet by mouth every 12 hours for 6 doses.    aspirin 81 mg chewable tablet  Take 1 tablet by mouth once daily.  Start taking on: May 4, 2024     STOP taking these medications     sucralfate 1 gram tablet  Commonly known as: CARAFATE     Home Care: [facility] Phone: [phone]     Unsuccessful attempts x 2 to reach patient for PCP follow up.   Patient is scheduled for a hospital follow up with PCP

## 2024-05-09 ENCOUNTER — LAB (OUTPATIENT)
Dept: LAB | Facility: LAB | Age: 74
End: 2024-05-09
Payer: MEDICARE

## 2024-05-09 ENCOUNTER — OFFICE VISIT (OUTPATIENT)
Dept: PRIMARY CARE | Facility: CLINIC | Age: 74
End: 2024-05-09
Payer: MEDICARE

## 2024-05-09 VITALS
HEART RATE: 63 BPM | SYSTOLIC BLOOD PRESSURE: 104 MMHG | WEIGHT: 155 LBS | BODY MASS INDEX: 27.46 KG/M2 | OXYGEN SATURATION: 98 % | HEIGHT: 63 IN | TEMPERATURE: 97.9 F | RESPIRATION RATE: 18 BRPM | DIASTOLIC BLOOD PRESSURE: 34 MMHG

## 2024-05-09 DIAGNOSIS — R82.90 ABNORMAL URINALYSIS: ICD-10-CM

## 2024-05-09 DIAGNOSIS — R11.0 NAUSEA: Primary | ICD-10-CM

## 2024-05-09 DIAGNOSIS — J18.9 PNEUMONIA DUE TO INFECTIOUS ORGANISM, UNSPECIFIED LATERALITY, UNSPECIFIED PART OF LUNG: ICD-10-CM

## 2024-05-09 DIAGNOSIS — R11.0 NAUSEA: ICD-10-CM

## 2024-05-09 LAB
ALBUMIN SERPL BCP-MCNC: 2.8 G/DL (ref 3.4–5)
ALP SERPL-CCNC: 71 U/L (ref 33–136)
ALT SERPL W P-5'-P-CCNC: 11 U/L (ref 7–45)
ANION GAP SERPL CALC-SCNC: 14 MMOL/L (ref 10–20)
AST SERPL W P-5'-P-CCNC: 12 U/L (ref 9–39)
BILIRUB SERPL-MCNC: 0.2 MG/DL (ref 0–1.2)
BUN SERPL-MCNC: 87 MG/DL (ref 6–23)
CALCIUM SERPL-MCNC: 8.1 MG/DL (ref 8.6–10.3)
CHLORIDE SERPL-SCNC: 100 MMOL/L (ref 98–107)
CO2 SERPL-SCNC: 25 MMOL/L (ref 21–32)
CREAT SERPL-MCNC: 4.82 MG/DL (ref 0.5–1.05)
EGFRCR SERPLBLD CKD-EPI 2021: 9 ML/MIN/1.73M*2
GLUCOSE SERPL-MCNC: 119 MG/DL (ref 74–99)
MAGNESIUM SERPL-MCNC: 2.66 MG/DL (ref 1.6–2.4)
POC APPEARANCE, URINE: CLEAR
POC BILIRUBIN, URINE: NEGATIVE
POC BLOOD, URINE: NEGATIVE
POC COLOR, URINE: ABNORMAL
POC GLUCOSE, URINE: ABNORMAL MG/DL
POC KETONES, URINE: NEGATIVE MG/DL
POC LEUKOCYTES, URINE: NEGATIVE
POC NITRITE,URINE: NEGATIVE
POC PH, URINE: 6.5 PH
POC PROTEIN, URINE: ABNORMAL MG/DL
POC SPECIFIC GRAVITY, URINE: 1.02
POC UROBILINOGEN, URINE: 0.2 EU/DL
POTASSIUM SERPL-SCNC: 4.3 MMOL/L (ref 3.5–5.3)
PROT SERPL-MCNC: 4.9 G/DL (ref 6.4–8.2)
SODIUM SERPL-SCNC: 135 MMOL/L (ref 136–145)

## 2024-05-09 PROCEDURE — 82108 ASSAY OF ALUMINUM: CPT

## 2024-05-09 PROCEDURE — 3078F DIAST BP <80 MM HG: CPT | Performed by: FAMILY MEDICINE

## 2024-05-09 PROCEDURE — 83735 ASSAY OF MAGNESIUM: CPT

## 2024-05-09 PROCEDURE — 36415 COLL VENOUS BLD VENIPUNCTURE: CPT

## 2024-05-09 PROCEDURE — 3074F SYST BP LT 130 MM HG: CPT | Performed by: FAMILY MEDICINE

## 2024-05-09 PROCEDURE — 1159F MED LIST DOCD IN RCRD: CPT | Performed by: FAMILY MEDICINE

## 2024-05-09 PROCEDURE — 87086 URINE CULTURE/COLONY COUNT: CPT

## 2024-05-09 PROCEDURE — 99495 TRANSJ CARE MGMT MOD F2F 14D: CPT | Performed by: FAMILY MEDICINE

## 2024-05-09 PROCEDURE — 80053 COMPREHEN METABOLIC PANEL: CPT

## 2024-05-09 PROCEDURE — 4010F ACE/ARB THERAPY RXD/TAKEN: CPT | Performed by: FAMILY MEDICINE

## 2024-05-09 PROCEDURE — 81003 URINALYSIS AUTO W/O SCOPE: CPT | Performed by: FAMILY MEDICINE

## 2024-05-09 RX ORDER — ONDANSETRON 8 MG/1
8 TABLET, ORALLY DISINTEGRATING ORAL EVERY 8 HOURS PRN
Qty: 20 TABLET | Refills: 0 | Status: SHIPPED | OUTPATIENT
Start: 2024-05-09 | End: 2024-05-16

## 2024-05-09 NOTE — PROGRESS NOTES
"Subjective   Patient ID: Aracely Benito is a 74 y.o. female who presents for Follow-up (1 week follow up from [facility] for pneumonia and confusion. Pt states she is feeling better. She is still having some confusion.  She states she has also been feeling more anxious. ).    HPI     Review of Systems   Constitutional:  Negative for chills and fever.   HENT:  Negative for sore throat and trouble swallowing.    Respiratory:  Positive for cough. Negative for chest tightness and shortness of breath.    Cardiovascular:  Negative for chest pain, palpitations and leg swelling.   Gastrointestinal:  Negative for abdominal pain.   Skin:  Negative for rash.       Objective   BP (!) 104/34   Pulse 63   Temp 36.6 °C (97.9 °F)   Resp 18   Ht 1.6 m (5' 3\")   Wt 70.3 kg (155 lb) Comment: Pt reported weight  SpO2 98%   BMI 27.46 kg/m²     Physical Exam  Constitutional:       Appearance: Normal appearance.   HENT:      Head: Normocephalic.   Eyes:      Conjunctiva/sclera: Conjunctivae normal.   Cardiovascular:      Rate and Rhythm: Normal rate and regular rhythm.      Heart sounds: Normal heart sounds.   Pulmonary:      Effort: Pulmonary effort is normal.      Breath sounds: Normal breath sounds.   Musculoskeletal:      Cervical back: Neck supple.      Right lower le+ Edema present.      Left lower le+ Edema present.   Skin:     General: Skin is warm and dry.   Neurological:      Mental Status: She is alert.         Assessment/Plan   Problem List Items Addressed This Visit    None  Visit Diagnoses         Codes    Nausea    -  Primary R11.0    Relevant Medications    ondansetron ODT (Zofran-ODT) 8 mg disintegrating tablet    Other Relevant Orders    POCT UA Automated manually resulted (Completed)    Comprehensive Metabolic Panel (Completed)    Magnesium (Completed)    Aluminum, Blood (Completed)    Abnormal urinalysis     R82.90    Relevant Orders    Urine Culture (Completed)    Pneumonia due to infectious " organism, unspecified laterality, unspecified part of lung     J18.9        Nausea due to cough

## 2024-05-11 LAB
ALUMINUM SERPL-MCNC: <5 UG/L (ref 0–15)
BACTERIA UR CULT: NORMAL

## 2024-05-13 ENCOUNTER — TELEPHONE (OUTPATIENT)
Dept: PRIMARY CARE | Facility: CLINIC | Age: 74
End: 2024-05-13
Payer: MEDICARE

## 2024-05-13 NOTE — TELEPHONE ENCOUNTER
----- Message from Pablo Estrella DO sent at 5/12/2024 12:38 PM EDT -----  Please call the patient regarding her abnormal result.  Her Cr was elevated. Her magnesium was a little elevated ahd her calcium was a little low.  I would suggest staying well hydrated. Tylenol only if needed

## 2024-05-14 ENCOUNTER — PATIENT OUTREACH (OUTPATIENT)
Dept: PRIMARY CARE | Facility: CLINIC | Age: 74
End: 2024-05-14
Payer: MEDICARE

## 2024-05-14 ASSESSMENT — ENCOUNTER SYMPTOMS
CHEST TIGHTNESS: 0
TROUBLE SWALLOWING: 0
SHORTNESS OF BREATH: 0
FEVER: 0
ABDOMINAL PAIN: 0
SORE THROAT: 0
PALPITATIONS: 0
COUGH: 1
CHILLS: 0

## 2024-05-14 NOTE — PROGRESS NOTES
Unable to reach patient for call back after patient's follow up appointment with PCP. 5/9/2024  M with call back number for patient to call if needed   If no voicemail available call attempts x 2 were made to contact the patient to assist with any questions or concerns patient may have.

## 2024-05-15 ENCOUNTER — DOCUMENTATION (OUTPATIENT)
Dept: PRIMARY CARE | Facility: CLINIC | Age: 74
End: 2024-05-15
Payer: MEDICARE

## 2024-05-30 ENCOUNTER — PATIENT OUTREACH (OUTPATIENT)
Dept: PRIMARY CARE | Facility: CLINIC | Age: 74
End: 2024-05-30
Payer: MEDICARE

## 2024-05-31 ENCOUNTER — OFFICE VISIT (OUTPATIENT)
Dept: PRIMARY CARE | Facility: CLINIC | Age: 74
End: 2024-05-31
Payer: MEDICARE

## 2024-05-31 ENCOUNTER — TELEPHONE (OUTPATIENT)
Dept: PRIMARY CARE | Facility: CLINIC | Age: 74
End: 2024-05-31

## 2024-05-31 ENCOUNTER — LAB (OUTPATIENT)
Dept: LAB | Facility: LAB | Age: 74
End: 2024-05-31
Payer: MEDICARE

## 2024-05-31 VITALS
BODY MASS INDEX: 26.61 KG/M2 | TEMPERATURE: 97.3 F | HEIGHT: 64 IN | DIASTOLIC BLOOD PRESSURE: 68 MMHG | SYSTOLIC BLOOD PRESSURE: 116 MMHG | HEART RATE: 60 BPM | RESPIRATION RATE: 19 BRPM | OXYGEN SATURATION: 97 %

## 2024-05-31 DIAGNOSIS — N18.5 CHRONIC KIDNEY DISEASE, STAGE 5 (MULTI): Primary | ICD-10-CM

## 2024-05-31 DIAGNOSIS — Z00.00 MEDICARE ANNUAL WELLNESS VISIT, SUBSEQUENT: Primary | ICD-10-CM

## 2024-05-31 DIAGNOSIS — E11.9 TYPE 2 DIABETES MELLITUS WITHOUT COMPLICATION, WITHOUT LONG-TERM CURRENT USE OF INSULIN (MULTI): ICD-10-CM

## 2024-05-31 DIAGNOSIS — G47.33 OBSTRUCTIVE SLEEP APNEA OF ADULT: ICD-10-CM

## 2024-05-31 DIAGNOSIS — F32.0 DEPRESSION, MAJOR, SINGLE EPISODE, MILD (CMS-HCC): ICD-10-CM

## 2024-05-31 DIAGNOSIS — N18.5 STAGE 5 CHRONIC KIDNEY DISEASE (MULTI): ICD-10-CM

## 2024-05-31 LAB
ALBUMIN SERPL BCP-MCNC: 3.2 G/DL (ref 3.4–5)
ANION GAP SERPL CALC-SCNC: 15 MMOL/L (ref 10–20)
BUN SERPL-MCNC: 87 MG/DL (ref 6–23)
CALCIUM SERPL-MCNC: 8.7 MG/DL (ref 8.6–10.3)
CHLORIDE SERPL-SCNC: 102 MMOL/L (ref 98–107)
CO2 SERPL-SCNC: 25 MMOL/L (ref 21–32)
CREAT SERPL-MCNC: 4.78 MG/DL (ref 0.5–1.05)
EGFRCR SERPLBLD CKD-EPI 2021: 9 ML/MIN/1.73M*2
EST. AVERAGE GLUCOSE BLD GHB EST-MCNC: 94 MG/DL
GLUCOSE SERPL-MCNC: 124 MG/DL (ref 74–99)
HBA1C MFR BLD: 4.9 %
HBV SURFACE AB SER-ACNC: <3.1 MIU/ML
HBV SURFACE AG SERPL QL IA: NONREACTIVE
INR PPP: 1 (ref 0.9–1.1)
PHOSPHATE SERPL-MCNC: 6.4 MG/DL (ref 2.5–4.9)
POTASSIUM SERPL-SCNC: 4.3 MMOL/L (ref 3.5–5.3)
PROTHROMBIN TIME: 11.1 SECONDS (ref 9.8–12.8)
SODIUM SERPL-SCNC: 138 MMOL/L (ref 136–145)

## 2024-05-31 PROCEDURE — 83036 HEMOGLOBIN GLYCOSYLATED A1C: CPT

## 2024-05-31 PROCEDURE — 99213 OFFICE O/P EST LOW 20 MIN: CPT | Performed by: FAMILY MEDICINE

## 2024-05-31 PROCEDURE — 87340 HEPATITIS B SURFACE AG IA: CPT

## 2024-05-31 PROCEDURE — 36415 COLL VENOUS BLD VENIPUNCTURE: CPT

## 2024-05-31 PROCEDURE — 86706 HEP B SURFACE ANTIBODY: CPT

## 2024-05-31 PROCEDURE — G0439 PPPS, SUBSEQ VISIT: HCPCS | Performed by: FAMILY MEDICINE

## 2024-05-31 PROCEDURE — 80069 RENAL FUNCTION PANEL: CPT

## 2024-05-31 PROCEDURE — 85610 PROTHROMBIN TIME: CPT

## 2024-05-31 RX ORDER — BUSPIRONE HYDROCHLORIDE 5 MG/1
5 TABLET ORAL 2 TIMES DAILY
COMMUNITY

## 2024-05-31 ASSESSMENT — ACTIVITIES OF DAILY LIVING (ADL)
TAKING_MEDICATION: INDEPENDENT
DRESSING: NEEDS ASSISTANCE
GROCERY_SHOPPING: NEEDS ASSISTANCE
MANAGING_FINANCES: NEEDS ASSISTANCE
MANAGING_FINANCES: INDEPENDENT
BATHING: NEEDS ASSISTANCE
GROCERY_SHOPPING: NEEDS ASSISTANCE
DOING_HOUSEWORK: INDEPENDENT

## 2024-05-31 ASSESSMENT — PATIENT HEALTH QUESTIONNAIRE - PHQ9
2. FEELING DOWN, DEPRESSED OR HOPELESS: NOT AT ALL
1. LITTLE INTEREST OR PLEASURE IN DOING THINGS: NOT AT ALL
SUM OF ALL RESPONSES TO PHQ9 QUESTIONS 1 AND 2: 0

## 2024-05-31 NOTE — TELEPHONE ENCOUNTER
Carla,    Dr Estrella wanted me to reach out to you on behalf of the patient.  The patient and her  both agree for her to be apart of the chronic care program.  Dr etsrella will place the referral.

## 2024-06-01 PROBLEM — N18.5 STAGE 5 CHRONIC KIDNEY DISEASE (MULTI): Status: ACTIVE | Noted: 2024-06-01

## 2024-06-01 ASSESSMENT — ENCOUNTER SYMPTOMS
POLYPHAGIA: 0
CONFUSION: 1
APPETITE CHANGE: 0
HEADACHES: 0
FREQUENCY: 0
FATIGUE: 1
CHEST TIGHTNESS: 0
NAUSEA: 0
POLYDIPSIA: 0
VOMITING: 0
NUMBNESS: 0
DIZZINESS: 1
MYALGIAS: 0
ARTHRALGIAS: 1
WEAKNESS: 1
DYSPHORIC MOOD: 1
SHORTNESS OF BREATH: 0
DIARRHEA: 0

## 2024-06-03 ENCOUNTER — TELEPHONE (OUTPATIENT)
Dept: PRIMARY CARE | Facility: CLINIC | Age: 74
End: 2024-06-03
Payer: MEDICARE

## 2024-06-03 NOTE — TELEPHONE ENCOUNTER
----- Message from Pablo Estrella DO sent at 5/31/2024  7:29 PM EDT -----  Call Aracely Benito Their labs were normal  A1c is 4.9

## 2024-06-13 ENCOUNTER — PATIENT MESSAGE (OUTPATIENT)
Dept: PRIMARY CARE | Facility: CLINIC | Age: 74
End: 2024-06-13
Payer: MEDICARE

## 2024-06-13 ENCOUNTER — PATIENT OUTREACH (OUTPATIENT)
Dept: PRIMARY CARE | Facility: CLINIC | Age: 74
End: 2024-06-13
Payer: MEDICARE

## 2024-06-19 ENCOUNTER — APPOINTMENT (OUTPATIENT)
Dept: PRIMARY CARE | Facility: CLINIC | Age: 74
End: 2024-06-19
Payer: MEDICARE

## 2024-06-27 ENCOUNTER — PATIENT OUTREACH (OUTPATIENT)
Dept: PRIMARY CARE | Facility: CLINIC | Age: 74
End: 2024-06-27
Payer: MEDICARE

## 2024-07-16 ENCOUNTER — PATIENT OUTREACH (OUTPATIENT)
Dept: PRIMARY CARE | Facility: CLINIC | Age: 74
End: 2024-07-16
Payer: MEDICARE

## 2024-07-16 NOTE — PROGRESS NOTES
CTS spoke with pt to address any final questions or concerns regarding hospitalization.  Pt reports doing well and has no concerns at this time  Starting dialysis tomorrow, per patient.

## 2024-07-19 ENCOUNTER — PATIENT OUTREACH (OUTPATIENT)
Dept: PRIMARY CARE | Facility: CLINIC | Age: 74
End: 2024-07-19
Payer: MEDICARE

## 2024-08-01 ENCOUNTER — APPOINTMENT (OUTPATIENT)
Dept: PRIMARY CARE | Facility: CLINIC | Age: 74
End: 2024-08-01
Payer: MEDICARE

## 2024-08-01 VITALS
RESPIRATION RATE: 18 BRPM | HEART RATE: 68 BPM | SYSTOLIC BLOOD PRESSURE: 128 MMHG | TEMPERATURE: 97.1 F | HEIGHT: 64 IN | WEIGHT: 169 LBS | DIASTOLIC BLOOD PRESSURE: 60 MMHG | BODY MASS INDEX: 28.85 KG/M2

## 2024-08-01 DIAGNOSIS — E11.9 TYPE 2 DIABETES MELLITUS WITHOUT COMPLICATION, WITHOUT LONG-TERM CURRENT USE OF INSULIN (MULTI): Primary | ICD-10-CM

## 2024-08-01 DIAGNOSIS — K59.00 CONSTIPATION, UNSPECIFIED CONSTIPATION TYPE: ICD-10-CM

## 2024-08-01 DIAGNOSIS — N18.5 STAGE 5 CHRONIC KIDNEY DISEASE (MULTI): ICD-10-CM

## 2024-08-01 PROCEDURE — 99213 OFFICE O/P EST LOW 20 MIN: CPT | Performed by: FAMILY MEDICINE

## 2024-08-01 RX ORDER — LACTULOSE 10 G/15ML
20 SOLUTION ORAL
COMMUNITY

## 2024-08-01 ASSESSMENT — ENCOUNTER SYMPTOMS
NUMBNESS: 0
APPETITE CHANGE: 0
SHORTNESS OF BREATH: 0
DIARRHEA: 0
HEADACHES: 0
DIZZINESS: 0
WEAKNESS: 0
CHEST TIGHTNESS: 0
POLYDIPSIA: 0
MYALGIAS: 0
POLYPHAGIA: 0
FATIGUE: 0
ARTHRALGIAS: 0
FREQUENCY: 0
VOMITING: 0
NAUSEA: 0

## 2024-08-01 NOTE — PROGRESS NOTES
"Subjective   Patient ID: Aracely Benito is a 74 y.o. female who presents for Follow-up (6 week follow up. Pt states she will be starting dialysis in Aug. She is having trouble with her PD catheter (draining). She is feeling slightly better and is sleeping better. She is still having dizziness but that has improved slightly. ).    HPI     Review of Systems   Constitutional:  Negative for appetite change and fatigue.   Eyes:  Negative for visual disturbance.   Respiratory:  Negative for chest tightness and shortness of breath.    Cardiovascular:  Negative for chest pain and leg swelling.   Gastrointestinal:  Negative for diarrhea, nausea and vomiting.   Endocrine: Negative for polydipsia, polyphagia and polyuria.   Genitourinary:  Negative for frequency and urgency.   Musculoskeletal:  Negative for arthralgias and myalgias.   Neurological:  Negative for dizziness, syncope, weakness, numbness and headaches.       Objective   /60   Pulse 68   Temp 36.2 °C (97.1 °F)   Resp 18   Ht 1.626 m (5' 4\")   Wt 76.7 kg (169 lb)   BMI 29.01 kg/m²     Physical Exam  Constitutional:       Appearance: Normal appearance.   HENT:      Head: Normocephalic.   Eyes:      Conjunctiva/sclera: Conjunctivae normal.   Cardiovascular:      Rate and Rhythm: Normal rate and regular rhythm.      Heart sounds: Normal heart sounds.   Pulmonary:      Effort: Pulmonary effort is normal.      Breath sounds: Normal breath sounds.   Musculoskeletal:      Cervical back: Neck supple.      Right lower le+ Edema present.      Left lower le+ Edema present.   Skin:     General: Skin is warm and dry.   Neurological:      Mental Status: She is alert.         Assessment/Plan   Problem List Items Addressed This Visit             ICD-10-CM    Type 2 diabetes mellitus (Multi) - Primary E11.9    Stage 5 chronic kidney disease (Multi) N18.5     Other Visit Diagnoses         Codes    Constipation, unspecified constipation type     K59.00        Pt " using miralax and lactulose

## 2024-09-01 DIAGNOSIS — I10 HYPERTENSION, UNSPECIFIED TYPE: ICD-10-CM

## 2024-09-04 RX ORDER — ATORVASTATIN CALCIUM 20 MG/1
20 TABLET, FILM COATED ORAL DAILY
Qty: 90 TABLET | Refills: 3 | Status: SHIPPED | OUTPATIENT
Start: 2024-09-04

## 2024-09-05 ENCOUNTER — APPOINTMENT (OUTPATIENT)
Dept: PRIMARY CARE | Facility: CLINIC | Age: 74
End: 2024-09-05
Payer: MEDICARE

## 2024-09-05 VITALS
DIASTOLIC BLOOD PRESSURE: 66 MMHG | SYSTOLIC BLOOD PRESSURE: 132 MMHG | RESPIRATION RATE: 18 BRPM | OXYGEN SATURATION: 96 % | WEIGHT: 194 LBS | HEART RATE: 63 BPM | BODY MASS INDEX: 33.12 KG/M2 | TEMPERATURE: 97.2 F | HEIGHT: 64 IN

## 2024-09-05 DIAGNOSIS — E11.9 TYPE 2 DIABETES MELLITUS WITHOUT COMPLICATION, WITHOUT LONG-TERM CURRENT USE OF INSULIN (MULTI): ICD-10-CM

## 2024-09-05 DIAGNOSIS — M10.9 GOUT, UNSPECIFIED CAUSE, UNSPECIFIED CHRONICITY, UNSPECIFIED SITE: ICD-10-CM

## 2024-09-05 DIAGNOSIS — Z00.00 HEALTH CARE MAINTENANCE: ICD-10-CM

## 2024-09-05 DIAGNOSIS — F41.9 ANXIETY: Primary | ICD-10-CM

## 2024-09-05 LAB
POC BILIRUBIN, URINE: NEGATIVE
POC BLOOD, URINE: NEGATIVE
POC GLUCOSE, URINE: NEGATIVE MG/DL
POC HEMOGLOBIN A1C: 5.5 % (ref 4.2–6.5)
POC KETONES, URINE: NEGATIVE MG/DL
POC LEUKOCYTES, URINE: NEGATIVE
POC NITRITE,URINE: NEGATIVE
POC PH, URINE: 7 PH
POC PROTEIN, URINE: ABNORMAL MG/DL
POC SPECIFIC GRAVITY, URINE: 1.02
POC UROBILINOGEN, URINE: 0.2 EU/DL

## 2024-09-05 PROCEDURE — 83036 HEMOGLOBIN GLYCOSYLATED A1C: CPT | Performed by: FAMILY MEDICINE

## 2024-09-05 PROCEDURE — 99213 OFFICE O/P EST LOW 20 MIN: CPT | Performed by: FAMILY MEDICINE

## 2024-09-05 PROCEDURE — 81003 URINALYSIS AUTO W/O SCOPE: CPT | Performed by: FAMILY MEDICINE

## 2024-09-05 RX ORDER — LORAZEPAM 0.5 MG/1
0.25 TABLET ORAL EVERY 6 HOURS PRN
Qty: 15 TABLET | Refills: 0 | Status: SHIPPED | OUTPATIENT
Start: 2024-09-05 | End: 2024-09-15

## 2024-09-05 RX ORDER — ALLOPURINOL 100 MG/1
100 TABLET ORAL DAILY
Qty: 30 TABLET | Refills: 3 | Status: SHIPPED | OUTPATIENT
Start: 2024-09-05

## 2024-09-05 ASSESSMENT — ENCOUNTER SYMPTOMS
ARTHRALGIAS: 0
POLYPHAGIA: 0
SLEEP DISTURBANCE: 1
SHORTNESS OF BREATH: 0
CHEST TIGHTNESS: 0
NUMBNESS: 0
VOMITING: 0
DIZZINESS: 0
HEADACHES: 0
NAUSEA: 0
WEAKNESS: 0
POLYDIPSIA: 0
MYALGIAS: 0
DIARRHEA: 0
FATIGUE: 1
APPETITE CHANGE: 0
CONFUSION: 1
FREQUENCY: 0

## 2024-09-05 NOTE — PROGRESS NOTES
"Subjective   Patient ID: Aracely Benito is a 74 y.o. female who presents for Med Refill (3 month med check for DM and A1C.  states her confusion and anxiety have increased. She is having Permcath placed .   ).    HPI     Review of Systems   Constitutional:  Positive for fatigue. Negative for appetite change.   Eyes:  Negative for visual disturbance.   Respiratory:  Negative for chest tightness and shortness of breath.    Cardiovascular:  Positive for leg swelling. Negative for chest pain.   Gastrointestinal:  Negative for diarrhea, nausea and vomiting.   Endocrine: Negative for polydipsia, polyphagia and polyuria.   Genitourinary:  Negative for frequency and urgency.   Musculoskeletal:  Negative for arthralgias and myalgias.   Neurological:  Negative for dizziness, syncope, weakness, numbness and headaches.   Psychiatric/Behavioral:  Positive for confusion and sleep disturbance.        Objective   /66   Pulse 63   Temp 36.2 °C (97.2 °F)   Resp 18   Ht 1.626 m (5' 4\")   Wt 88 kg (194 lb)   SpO2 96%   BMI 33.30 kg/m²     Physical Exam  Constitutional:       Appearance: Normal appearance. She is obese.   HENT:      Head: Normocephalic.      Mouth/Throat:      Mouth: Mucous membranes are moist.   Eyes:      Conjunctiva/sclera: Conjunctivae normal.   Cardiovascular:      Rate and Rhythm: Normal rate and regular rhythm.   Pulmonary:      Effort: Pulmonary effort is normal.      Breath sounds: Normal breath sounds.   Musculoskeletal:         General: Normal range of motion.      Cervical back: Neck supple.      Right lower le+ Edema present.      Left lower le+ Edema present.   Skin:     General: Skin is warm and dry.   Neurological:      General: No focal deficit present.      Mental Status: She is alert and oriented to person, place, and time.   Psychiatric:         Mood and Affect: Mood normal.         Assessment/Plan   Problem List Items Addressed This Visit             " ICD-10-CM    Type 2 diabetes mellitus (Multi) E11.9    Relevant Orders    Follow Up In [facility] - PCP    Follow Up In [facility] - PCP    POCT glycosylated hemoglobin (Hb A1C) manually resulted (Completed)     Other Visit Diagnoses         Codes    Anxiety    -  Primary F41.9    Relevant Medications    LORazepam (Ativan) 0.5 mg tablet    Gout, unspecified cause, unspecified chronicity, unspecified site     M10.9    Relevant Medications    allopurinol (Zyloprim) 100 mg tablet    Health care maintenance     Z00.00    Relevant Orders    POCT UA Automated manually resulted (Completed)

## 2024-09-05 NOTE — TELEPHONE ENCOUNTER
----- Message from Pablo Estrella sent at 9/5/2024 12:52 PM EDT -----  Call Aracely Benito Their labs were normal  Her urine sample was normal

## 2024-09-14 DIAGNOSIS — I10 PRIMARY HYPERTENSION: ICD-10-CM

## 2024-09-16 RX ORDER — AMLODIPINE BESYLATE 5 MG/1
10 TABLET ORAL
Qty: 180 TABLET | Refills: 3 | Status: SHIPPED | OUTPATIENT
Start: 2024-09-16

## 2024-09-20 ENCOUNTER — TELEPHONE (OUTPATIENT)
Dept: PRIMARY CARE | Facility: CLINIC | Age: 74
End: 2024-09-20
Payer: MEDICARE

## 2024-09-20 NOTE — TELEPHONE ENCOUNTER
Recommend cbc cmp lipid TSH UTI CXR prior to appt   [facility] calling to confirm PCP will follow Pt for home care orders. She will be needing PT, OT, and Speech. Please advise.

## 2024-09-23 ENCOUNTER — DOCUMENTATION (OUTPATIENT)
Dept: PRIMARY CARE | Facility: CLINIC | Age: 74
End: 2024-09-23
Payer: MEDICARE

## 2024-09-24 ENCOUNTER — PATIENT OUTREACH (OUTPATIENT)
Dept: PRIMARY CARE | Facility: CLINIC | Age: 74
End: 2024-09-24
Payer: MEDICARE

## 2024-09-24 NOTE — PROGRESS NOTES
Discharge Facility: [facility]  Discharge Diagnosis: Macrocytic anemia   Admission Date: 9/16/2024  Discharge Date: 9/20/2024    PCP Appointment Date: none  Specialist Appointment Date:    -surgery 9/26/2024    Hospital Encounter and Summary Linked: Yes      KEY MEDICATION CHANGES:   Pantoprazole BID    LAB MONITORING NEEDED: Test hgb When PCP follow-up     FOLLOW UP APPOINTMENTS:   Future Appointments   Date Time Provider Department Center   [facility]   10/2/2024 2:45 PM [facility]    10/17/2024 10:00 AM Morgan Go MD [facility]   11/12/2024 9:00 AM [facility]   [facility]   Phone# [phone]   Start of Care -- will call patient at home to plan for SOC     Two attempts were made to reach patient within two business days after discharge. Voicemail left with contact information for patient to call back with any non-emergent questions or concerns.

## 2024-10-04 ENCOUNTER — DOCUMENTATION (OUTPATIENT)
Dept: PRIMARY CARE | Facility: CLINIC | Age: 74
End: 2024-10-04
Payer: MEDICARE

## 2024-10-04 ENCOUNTER — PATIENT OUTREACH (OUTPATIENT)
Dept: PRIMARY CARE | Facility: CLINIC | Age: 74
End: 2024-10-04
Payer: MEDICARE

## 2024-10-10 ENCOUNTER — PATIENT OUTREACH (OUTPATIENT)
Dept: PRIMARY CARE | Facility: CLINIC | Age: 74
End: 2024-10-10
Payer: MEDICARE

## 2024-10-10 NOTE — PROGRESS NOTES
Patient was introduced to the Chronic Care Management Services.    Patient declined services on 10/10/24

## 2024-10-29 ENCOUNTER — PATIENT OUTREACH (OUTPATIENT)
Dept: PRIMARY CARE | Facility: CLINIC | Age: 74
End: 2024-10-29
Payer: MEDICARE

## 2024-11-06 ENCOUNTER — PATIENT OUTREACH (OUTPATIENT)
Dept: PRIMARY CARE | Facility: CLINIC | Age: 74
End: 2024-11-06
Payer: MEDICARE

## 2024-11-27 ENCOUNTER — PATIENT OUTREACH (OUTPATIENT)
Dept: PRIMARY CARE | Facility: CLINIC | Age: 74
End: 2024-11-27
Payer: MEDICARE

## 2024-12-05 ENCOUNTER — APPOINTMENT (OUTPATIENT)
Dept: PRIMARY CARE | Facility: CLINIC | Age: 74
End: 2024-12-05
Payer: MEDICARE

## 2024-12-05 VITALS
HEIGHT: 64 IN | HEART RATE: 96 BPM | WEIGHT: 161 LBS | OXYGEN SATURATION: 96 % | BODY MASS INDEX: 27.49 KG/M2 | DIASTOLIC BLOOD PRESSURE: 56 MMHG | RESPIRATION RATE: 18 BRPM | TEMPERATURE: 96.5 F | SYSTOLIC BLOOD PRESSURE: 98 MMHG

## 2024-12-05 DIAGNOSIS — E11.9 TYPE 2 DIABETES MELLITUS WITHOUT COMPLICATION, WITHOUT LONG-TERM CURRENT USE OF INSULIN (MULTI): ICD-10-CM

## 2024-12-05 DIAGNOSIS — G21.19 PARKINSONISM DUE TO DRUG (MULTI): Primary | ICD-10-CM

## 2024-12-05 DIAGNOSIS — D69.6 THROMBOCYTOPENIA, UNSPECIFIED (CMS-HCC): ICD-10-CM

## 2024-12-05 DIAGNOSIS — I10 PRIMARY HYPERTENSION: ICD-10-CM

## 2024-12-05 PROCEDURE — 3078F DIAST BP <80 MM HG: CPT | Performed by: FAMILY MEDICINE

## 2024-12-05 PROCEDURE — 1123F ACP DISCUSS/DSCN MKR DOCD: CPT | Performed by: FAMILY MEDICINE

## 2024-12-05 PROCEDURE — 1159F MED LIST DOCD IN RCRD: CPT | Performed by: FAMILY MEDICINE

## 2024-12-05 PROCEDURE — 99213 OFFICE O/P EST LOW 20 MIN: CPT | Performed by: FAMILY MEDICINE

## 2024-12-05 PROCEDURE — 3074F SYST BP LT 130 MM HG: CPT | Performed by: FAMILY MEDICINE

## 2024-12-05 PROCEDURE — 3008F BODY MASS INDEX DOCD: CPT | Performed by: FAMILY MEDICINE

## 2024-12-05 PROCEDURE — 3044F HG A1C LEVEL LT 7.0%: CPT | Performed by: FAMILY MEDICINE

## 2024-12-05 RX ORDER — CARVEDILOL 6.25 MG/1
6.25 TABLET ORAL 2 TIMES DAILY
Qty: 180 TABLET | Refills: 3 | Status: SHIPPED | OUTPATIENT
Start: 2024-12-05 | End: 2025-12-05

## 2024-12-05 RX ORDER — CETIRIZINE HYDROCHLORIDE 10 MG/1
10 TABLET ORAL DAILY
COMMUNITY

## 2024-12-05 ASSESSMENT — ENCOUNTER SYMPTOMS
NAUSEA: 0
VOMITING: 0
ARTHRALGIAS: 0
SHORTNESS OF BREATH: 0
NUMBNESS: 0
CHEST TIGHTNESS: 0
MYALGIAS: 0
FREQUENCY: 0
POLYPHAGIA: 0
POLYDIPSIA: 0
FATIGUE: 1
APPETITE CHANGE: 0
DIARRHEA: 0
DIZZINESS: 0
WEAKNESS: 1
HEADACHES: 0

## 2024-12-05 NOTE — PROGRESS NOTES
"Subjective   Patient ID: Aracely Benito is a 74 y.o. female who presents for Med Refill (3 month med check. Pt would like to discus possibly stopping meds. /She also has appointment to discus kidney transplant Jan 2nd).    HPI     Review of Systems   Constitutional:  Positive for fatigue. Negative for appetite change.   Eyes:  Negative for visual disturbance.   Respiratory:  Negative for chest tightness and shortness of breath.    Cardiovascular:  Negative for chest pain and leg swelling.   Gastrointestinal:  Negative for diarrhea, nausea and vomiting.   Endocrine: Negative for polydipsia, polyphagia and polyuria.   Genitourinary:  Negative for frequency and urgency.   Musculoskeletal:  Positive for gait problem. Negative for arthralgias and myalgias.   Neurological:  Positive for weakness. Negative for dizziness, syncope, numbness and headaches.       Objective   BP 98/56   Pulse 96   Temp 35.8 °C (96.5 °F)   Resp 18   Ht 1.626 m (5' 4\")   Wt 73 kg (161 lb)   SpO2 96%   BMI 27.64 kg/m²     Physical Exam  Constitutional:       Appearance: Normal appearance.   HENT:      Head: Normocephalic and atraumatic.      Mouth/Throat:      Mouth: Mucous membranes are moist.   Eyes:      Extraocular Movements: Extraocular movements intact.      Conjunctiva/sclera: Conjunctivae normal.      Pupils: Pupils are equal, round, and reactive to light.      Funduscopic exam:     Right eye: No hemorrhage or AV nicking.         Left eye: No hemorrhage or AV nicking.   Cardiovascular:      Rate and Rhythm: Normal rate and regular rhythm.      Pulses: Normal pulses.      Heart sounds: Normal heart sounds.   Pulmonary:      Effort: Pulmonary effort is normal.      Breath sounds: Normal breath sounds.   Abdominal:      General: Abdomen is flat. Bowel sounds are normal.      Palpations: Abdomen is soft.   Musculoskeletal:         General: Normal range of motion.      Cervical back: Neck supple.      Right foot: No swelling or " Charcot foot.      Left foot: No swelling or Charcot foot.   Skin:     General: Skin is warm and dry.      Findings: No wound.   Neurological:      General: No focal deficit present.      Mental Status: She is alert and oriented to person, place, and time.      Sensory: No sensory deficit.      Motor: No weakness.      Gait: Gait abnormal.   Psychiatric:         Mood and Affect: Mood normal.         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

## 2025-01-06 ENCOUNTER — PATIENT MESSAGE (OUTPATIENT)
Dept: PRIMARY CARE | Facility: CLINIC | Age: 75
End: 2025-01-06
Payer: MEDICARE

## 2025-01-06 DIAGNOSIS — Z12.31 ENCOUNTER FOR SCREENING MAMMOGRAM FOR MALIGNANT NEOPLASM OF BREAST: Primary | ICD-10-CM

## 2025-01-20 ENCOUNTER — PATIENT MESSAGE (OUTPATIENT)
Dept: PRIMARY CARE | Facility: CLINIC | Age: 75
End: 2025-01-20
Payer: MEDICARE

## 2025-01-20 DIAGNOSIS — G21.19 PARKINSONISM DUE TO DRUG (MULTI): ICD-10-CM

## 2025-01-20 DIAGNOSIS — G47.33 OBSTRUCTIVE SLEEP APNEA OF ADULT: ICD-10-CM

## 2025-01-20 DIAGNOSIS — G24.01 TARDIVE DYSKINESIA: ICD-10-CM

## 2025-01-20 DIAGNOSIS — G25.9 MOVEMENT DISORDER: Primary | ICD-10-CM

## 2025-01-21 ENCOUNTER — TELEPHONE (OUTPATIENT)
Dept: HOME HEALTH SERVICES | Facility: HOME HEALTH | Age: 75
End: 2025-01-21
Payer: MEDICARE

## 2025-01-21 DIAGNOSIS — G25.71 AKATHISIA: Primary | ICD-10-CM

## 2025-01-21 DIAGNOSIS — G24.01 TARDIVE DYSKINESIA: ICD-10-CM

## 2025-01-21 DIAGNOSIS — G21.19 PARKINSONISM DUE TO DRUG (MULTI): ICD-10-CM

## 2025-01-21 NOTE — TELEPHONE ENCOUNTER
Pablo Estrella DO       Home Care received a fax 1/21/2025 for Aracely for OUTPATIENT SPEECH THERAPY. If home Speech therapy is required pleas enter orders in EPIC UNDER [id number]. Once this is completed we will review the referral . The fax referral sent will  be canceled       Thank you     LENNY HAINES LPN

## 2025-01-22 ENCOUNTER — DOCUMENTATION (OUTPATIENT)
Dept: HOME HEALTH SERVICES | Facility: HOME HEALTH | Age: 75
End: 2025-01-22
Payer: MEDICARE

## 2025-01-22 ENCOUNTER — HOME HEALTH ADMISSION (OUTPATIENT)
Dept: HOME HEALTH SERVICES | Facility: HOME HEALTH | Age: 75
End: 2025-01-22
Payer: MEDICARE

## 2025-01-22 NOTE — HH CARE COORDINATION
Home Care received a referral for Speech Language Pathology. Unfortunately, we are unable to accept and process the referral at this time.    Reason:  Patient wants referral sent externally    Patients, please reach out to the referring provider or your PCP to assist in obtaining an alternative home care agency and/or guidance to meet your needs.    Providers, please reach out to [facility] at [phone] with any questions regarding the declined referral.

## 2025-01-22 NOTE — HH CARE COORDINATION
Home Care received a Referral for Speech Language Pathology. We have processed the referral for a Start of Care on 01/23/2025.     If you have any questions or concerns, please feel free to contact us at [phone]. Follow the prompts, enter your five digit zip code, and you will be directed to your care team on WE[facility] 2.

## 2025-01-31 ENCOUNTER — PATIENT OUTREACH (OUTPATIENT)
Dept: PRIMARY CARE | Facility: CLINIC | Age: 75
End: 2025-01-31
Payer: MEDICARE

## 2025-01-31 NOTE — PROGRESS NOTES
Discharge Facility: [facility]  Discharge Diagnosis: Hypercalcemia  Admission Date: 1/27/2025  Discharge Date: 1/30/2025    PCP Appointment Date: none-3/6/2025  Specialist Appointment Date:   -geriatrics 2/25/2025  -neurology 7/8/2025    Hospital Encounter and Summary Linked: Yes  Patient Outreach with Sayra Reynolds CMA (01/31/2025)     Home Health Care   Agency [facility]   Phone# Phone: [phone]     Other Follow-Up   Type Dialysis   Name [facility]   Phone/Fax Phone: [phone]   Instructions continue current dialysis schedule     Two attempts were made to reach patient within two business days after discharge. Voicemail left with contact information for patient to call back with any non-emergent questions or concerns.

## 2025-02-13 ENCOUNTER — PATIENT OUTREACH (OUTPATIENT)
Dept: PRIMARY CARE | Facility: CLINIC | Age: 75
End: 2025-02-13
Payer: MEDICARE

## 2025-02-24 ENCOUNTER — PATIENT OUTREACH (OUTPATIENT)
Dept: PRIMARY CARE | Facility: CLINIC | Age: 75
End: 2025-02-24
Payer: MEDICARE

## 2025-02-24 RX ORDER — LAMOTRIGINE 150 MG/1
150 TABLET ORAL DAILY
COMMUNITY

## 2025-02-24 RX ORDER — LAMOTRIGINE 150 MG/1
1.5 TABLET ORAL NIGHTLY
COMMUNITY

## 2025-02-24 NOTE — PROGRESS NOTES
Discharge Facility: [facility]  Discharge Diagnosis: Acute metabolic encephalopathy   Admission Date: 2/17/2025  Discharge Date: 2/21/2025    PCP Appointment Date: 3/6/2025  Specialist Appointment Date:   -geriatrics 2/25/2025    Hospital Encounter and Summary Linked: Yes  See discharge assessment below for further details    KEY MEDICATION CHANGES:   Reduce trazodone to 100 mg  Baclofen 2.5 mg as needed once daily  Hold amlodipine until you see your primary care     START taking these medications     lamoTRIgine 150 mg tablet  Commonly known as: LaMICtal  Take 1.5 tablets by mouth daily at bedtime AND 1 tablet once daily.    CHANGE how you take these medications     baclofen 5 mg tablet  Take 0.5 tablets by mouth daily at bedtime.  What changed:   medication strength  how much to take    traZODone 100 mg tablet  Commonly known as: DESYREL  Take 1 tablet by mouth daily at bedtime.  What changed: how much to take     Wrap Up  Wrap Up Additional Comments: CTS spoke with patients spouse. SHe was admitted to [facility] on 2/17/2025 with acute metabolic encephalopathy. Discharged 2/21/2025 to home with home care. Spouse stated that patient was still weak from being in the hospital. Home care was out to the house yesterday 2/23/2025 and did the initial assessment. Reviewed medications with spouse. He had a question on why to hold the amlodipine. I did look through the note and I did not see anything. I did advise the patient to follow with the discharge instructions until seen in follow up. Patient is scheduled to see PCP on 3/6/2025. No other questions or concerns at this time. (2/24/2025 10:33 AM)  Call End Time: 1044 (2/24/2025 10:33 AM)    Engagement  Call Start Time: 1033 (2/24/2025 10:33 AM)    Medications  Medications reviewed with patient/caregiver?: Yes (2/24/2025 10:33 AM)  Is the patient having any side effects they believe may be caused by any medication additions or changes?: No (2/24/2025 10:33 AM)  Does the  patient have all medications ordered at discharge?: Yes (2/24/2025 10:33 AM)  Care Management Interventions: No intervention needed (2/24/2025 10:33 AM)  Prescription Comments: hold: amlodipine, reduce trazadone and add baclofen (2/24/2025 10:33 AM)  Is the patient taking all medications as directed (includes completed medication regime)?: Yes (2/24/2025 10:33 AM)  Medication Comments: see med list (2/24/2025 10:33 AM)    Appointments  Does the patient have a primary care provider?: Yes (2/24/2025 10:33 AM)  Care Management Interventions: Verified appointment date/time/provider (2/24/2025 10:33 AM)  Has the patient kept scheduled appointments due by today?: Yes (2/24/2025 10:33 AM)  Care Management Interventions: Advised patient to keep appointment (2/24/2025 10:33 AM)    Self Management  What is the home health agency?: home care. no agency listed (2/24/2025 10:33 AM)  Has home health visited the patient within 72 hours of discharge?: Yes (2/24/2025 10:33 AM)  What Durable Medical Equipment (DME) was ordered?: n/a (2/24/2025 10:33 AM)    Patient Teaching  Does the patient have access to their discharge instructions?: Yes (2/24/2025 10:33 AM)  Care Management Interventions: Reviewed instructions with patient (2/24/2025 10:33 AM)  What is the patient's perception of their health status since discharge?: Same (2/24/2025 10:33 AM)  Is the patient/caregiver able to teach back the hierarchy of who to call/visit for symptoms/problems? PCP, Specialist, Home Health nurse, Urgent Care, ED, 911: Yes (2/24/2025 10:33 AM)  Patient/Caregiver Education Comments: see wrap up (2/24/2025 10:33 AM)

## 2025-03-06 ENCOUNTER — APPOINTMENT (OUTPATIENT)
Dept: PRIMARY CARE | Facility: CLINIC | Age: 75
End: 2025-03-06
Payer: MEDICARE

## 2025-03-06 VITALS
BODY MASS INDEX: 29.18 KG/M2 | SYSTOLIC BLOOD PRESSURE: 123 MMHG | WEIGHT: 170 LBS | HEART RATE: 67 BPM | DIASTOLIC BLOOD PRESSURE: 70 MMHG | OXYGEN SATURATION: 99 %

## 2025-03-06 DIAGNOSIS — E11.9 TYPE 2 DIABETES MELLITUS WITHOUT COMPLICATION, WITHOUT LONG-TERM CURRENT USE OF INSULIN (MULTI): Primary | ICD-10-CM

## 2025-03-06 DIAGNOSIS — F41.9 ANXIETY: ICD-10-CM

## 2025-03-06 DIAGNOSIS — L60.0 INGROWN TOENAIL: ICD-10-CM

## 2025-03-06 DIAGNOSIS — K59.00 CONSTIPATION, UNSPECIFIED CONSTIPATION TYPE: ICD-10-CM

## 2025-03-06 DIAGNOSIS — R41.0 DELIRIUM: ICD-10-CM

## 2025-03-06 DIAGNOSIS — K21.9 GASTROESOPHAGEAL REFLUX DISEASE, UNSPECIFIED WHETHER ESOPHAGITIS PRESENT: ICD-10-CM

## 2025-03-06 DIAGNOSIS — M54.9 BACK PAIN, UNSPECIFIED BACK LOCATION, UNSPECIFIED BACK PAIN LATERALITY, UNSPECIFIED CHRONICITY: ICD-10-CM

## 2025-03-06 DIAGNOSIS — Z79.899 MEDICATION MANAGEMENT: ICD-10-CM

## 2025-03-06 PROCEDURE — 99495 TRANSJ CARE MGMT MOD F2F 14D: CPT | Performed by: FAMILY MEDICINE

## 2025-03-06 RX ORDER — LORAZEPAM 0.5 MG/1
0.25 TABLET ORAL EVERY 6 HOURS PRN
Qty: 15 TABLET | Refills: 0 | Status: SHIPPED | OUTPATIENT
Start: 2025-03-06 | End: 2025-03-16

## 2025-03-06 RX ORDER — LEVOTHYROXINE SODIUM 25 UG/1
25 TABLET ORAL DAILY
Qty: 90 TABLET | Refills: 3 | Status: SHIPPED | OUTPATIENT
Start: 2025-03-06

## 2025-03-06 RX ORDER — BACLOFEN 5 MG/1
2.5 TABLET ORAL DAILY PRN
Qty: 15 TABLET | Refills: 11 | Status: SHIPPED | OUTPATIENT
Start: 2025-03-06 | End: 2026-03-06

## 2025-03-06 RX ORDER — DOCUSATE SODIUM 100 MG/1
100 CAPSULE, LIQUID FILLED ORAL 2 TIMES DAILY
Qty: 180 CAPSULE | Refills: 3 | Status: SHIPPED | OUTPATIENT
Start: 2025-03-06 | End: 2026-03-06

## 2025-03-06 RX ORDER — PANTOPRAZOLE SODIUM 40 MG/1
40 TABLET, DELAYED RELEASE ORAL 2 TIMES DAILY
Qty: 270 TABLET | Refills: 3 | Status: SHIPPED | OUTPATIENT
Start: 2025-03-06 | End: 2026-03-06

## 2025-03-06 ASSESSMENT — PATIENT HEALTH QUESTIONNAIRE - PHQ9
10. IF YOU CHECKED OFF ANY PROBLEMS, HOW DIFFICULT HAVE THESE PROBLEMS MADE IT FOR YOU TO DO YOUR WORK, TAKE CARE OF THINGS AT HOME, OR GET ALONG WITH OTHER PEOPLE: NOT DIFFICULT AT ALL
SUM OF ALL RESPONSES TO PHQ9 QUESTIONS 1 AND 2: 2
1. LITTLE INTEREST OR PLEASURE IN DOING THINGS: SEVERAL DAYS
2. FEELING DOWN, DEPRESSED OR HOPELESS: SEVERAL DAYS

## 2025-03-06 NOTE — PROGRESS NOTES
Subjective   Patient ID: AracelyAutumn Benito is a 74 y.o. female who presents for Med Refill (3 month med check , and TCM ).    HPI     Review of Systems   Constitutional:  Negative for appetite change and fatigue.   Eyes:  Negative for visual disturbance.   Respiratory:  Negative for chest tightness and shortness of breath.    Cardiovascular:  Negative for chest pain and leg swelling.   Gastrointestinal:  Negative for diarrhea, nausea and vomiting.   Endocrine: Negative for polydipsia, polyphagia and polyuria.   Genitourinary:  Negative for frequency and urgency.   Musculoskeletal:  Positive for arthralgias, gait problem and myalgias.   Neurological:  Negative for dizziness, syncope, weakness, numbness and headaches.   Psychiatric/Behavioral:  Positive for confusion.         Pt admitted for change in mental status       Objective   /70   Pulse 67   Wt 77.1 kg (170 lb)   SpO2 99%   BMI 29.18 kg/m²     Physical Exam  Constitutional:       Appearance: Normal appearance.   HENT:      Head: Normocephalic.   Eyes:      Conjunctiva/sclera: Conjunctivae normal.   Cardiovascular:      Rate and Rhythm: Normal rate and regular rhythm.      Heart sounds: Normal heart sounds.   Pulmonary:      Effort: Pulmonary effort is normal.      Breath sounds: Normal breath sounds.   Musculoskeletal:      Cervical back: Neck supple.        Feet:    Feet:      Left foot:      Skin integrity: Erythema present. No skin breakdown.      Toenail Condition: Left toenails are abnormally thick. Fungal disease present.  Skin:     General: Skin is warm and dry.   Neurological:      Mental Status: She is alert.      Motor: Weakness present.      Gait: Gait abnormal.         Assessment/Plan   Problem List Items Addressed This Visit             ICD-10-CM    Type 2 diabetes mellitus - Primary E11.9    Relevant Orders    Referral to Podiatry     Other Visit Diagnoses         Codes    Anxiety     F41.9    Relevant Medications    LORazepam  (Ativan) 0.5 mg tablet    Gastroesophageal reflux disease, unspecified whether esophagitis present     K21.9    Relevant Medications    pantoprazole (ProtoNix) 40 mg EC tablet    Medication management     Z79.899    Relevant Medications    levothyroxine (Synthroid, Levoxyl) 25 mcg tablet    Constipation, unspecified constipation type     K59.00    Relevant Medications    docusate sodium (Colace) 100 mg capsule    Back pain, unspecified back location, unspecified back pain laterality, unspecified chronicity     M54.9    Relevant Medications    baclofen (Lioresal) 5 mg tablet    Ingrown toenail     L60.0    Relevant Orders    Referral to Podiatry    Delirium     R41.0        Baclofen was decreased to 1/2 of a 5 mg tablet as needed.  Thought to be the cause of change in mental status. Not being removed by dialysis.

## 2025-03-07 ASSESSMENT — ENCOUNTER SYMPTOMS
CHEST TIGHTNESS: 0
NUMBNESS: 0
NAUSEA: 0
ARTHRALGIAS: 1
POLYPHAGIA: 0
APPETITE CHANGE: 0
FATIGUE: 0
DIARRHEA: 0
WEAKNESS: 0
MYALGIAS: 1
CONFUSION: 1
HEADACHES: 0
DIZZINESS: 0
FREQUENCY: 0
POLYDIPSIA: 0
SHORTNESS OF BREATH: 0
VOMITING: 0

## 2025-03-11 ENCOUNTER — PATIENT OUTREACH (OUTPATIENT)
Dept: PRIMARY CARE | Facility: CLINIC | Age: 75
End: 2025-03-11
Payer: MEDICARE

## 2025-03-11 NOTE — PROGRESS NOTES
Call regarding appt. with PCP on 3/6/2025 after hospitalization.  At time of outreach call the patient feels as if their condition has impoved since last visit.  Reviewed the PCP appointment with the pt and addressed any questions or concerns.

## 2025-04-10 ENCOUNTER — PATIENT OUTREACH (OUTPATIENT)
Dept: PRIMARY CARE | Facility: CLINIC | Age: 75
End: 2025-04-10
Payer: MEDICARE

## 2025-05-08 ENCOUNTER — APPOINTMENT (OUTPATIENT)
Dept: PRIMARY CARE | Facility: CLINIC | Age: 75
End: 2025-05-08
Payer: MEDICARE

## 2025-05-13 ENCOUNTER — PATIENT OUTREACH (OUTPATIENT)
Dept: PRIMARY CARE | Facility: CLINIC | Age: 75
End: 2025-05-13
Payer: MEDICARE

## 2025-05-28 DIAGNOSIS — F41.9 ANXIETY: ICD-10-CM

## 2025-05-28 RX ORDER — LORAZEPAM 0.5 MG/1
0.25 TABLET ORAL EVERY 6 HOURS PRN
Qty: 15 TABLET | Refills: 0 | Status: SHIPPED | OUTPATIENT
Start: 2025-05-28 | End: 2025-06-07

## 2025-05-28 NOTE — TELEPHONE ENCOUNTER
Patient requests prescription below    Last Office Visit: 3/6/2025   Next Office Visit: 6/10/2025     Requested Prescriptions     Pending Prescriptions Disp Refills    LORazepam (Ativan) 0.5 mg tablet 15 tablet 0     Sig: Take 0.5 tablets (0.25 mg) by mouth every 6 hours if needed for anxiety for up to 10 days.

## 2025-05-29 ENCOUNTER — APPOINTMENT (OUTPATIENT)
Dept: PRIMARY CARE | Facility: CLINIC | Age: 75
End: 2025-05-29
Payer: MEDICARE

## 2025-06-04 ENCOUNTER — TELEPHONE (OUTPATIENT)
Facility: HOSPITAL | Age: 75
End: 2025-06-04
Payer: MEDICARE

## 2025-06-09 ENCOUNTER — TELEPHONE (OUTPATIENT)
Facility: HOSPITAL | Age: 75
End: 2025-06-09
Payer: MEDICARE

## 2025-06-10 ENCOUNTER — APPOINTMENT (OUTPATIENT)
Dept: PRIMARY CARE | Facility: CLINIC | Age: 75
End: 2025-06-10
Payer: MEDICARE

## 2025-06-10 VITALS
DIASTOLIC BLOOD PRESSURE: 52 MMHG | HEART RATE: 68 BPM | SYSTOLIC BLOOD PRESSURE: 90 MMHG | HEIGHT: 64 IN | WEIGHT: 181 LBS | TEMPERATURE: 97 F | OXYGEN SATURATION: 98 % | RESPIRATION RATE: 18 BRPM | BODY MASS INDEX: 30.9 KG/M2

## 2025-06-10 DIAGNOSIS — R56.9 SEIZURES (MULTI): ICD-10-CM

## 2025-06-10 DIAGNOSIS — R09.81 NASAL CONGESTION: ICD-10-CM

## 2025-06-10 DIAGNOSIS — N95.2 ATROPHIC VAGINITIS: ICD-10-CM

## 2025-06-10 DIAGNOSIS — Z00.00 MEDICARE ANNUAL WELLNESS VISIT, SUBSEQUENT: Primary | ICD-10-CM

## 2025-06-10 DIAGNOSIS — M19.90 ARTHRITIS: ICD-10-CM

## 2025-06-10 DIAGNOSIS — E11.9 TYPE 2 DIABETES MELLITUS WITHOUT COMPLICATION, WITHOUT LONG-TERM CURRENT USE OF INSULIN: ICD-10-CM

## 2025-06-10 DIAGNOSIS — I10 PRIMARY HYPERTENSION: ICD-10-CM

## 2025-06-10 RX ORDER — FLUTICASONE PROPIONATE 50 MCG
2 SPRAY, SUSPENSION (ML) NASAL DAILY
Qty: 16 G | Refills: 11 | Status: SHIPPED | OUTPATIENT
Start: 2025-06-10 | End: 2026-06-10

## 2025-06-10 RX ORDER — ESTRADIOL 0.1 MG/G
2 CREAM VAGINAL DAILY
Qty: 42.5 G | Refills: 5 | Status: SHIPPED | OUTPATIENT
Start: 2025-06-10 | End: 2026-06-10

## 2025-06-10 RX ORDER — CARVEDILOL 6.25 MG/1
3.12 TABLET ORAL 2 TIMES DAILY
Start: 2025-06-10 | End: 2026-06-10

## 2025-06-10 ASSESSMENT — ENCOUNTER SYMPTOMS
MYALGIAS: 1
POLYPHAGIA: 0
WEAKNESS: 1
CONSTIPATION: 1
POLYDIPSIA: 0
FREQUENCY: 0
NUMBNESS: 0
VOMITING: 0
CHEST TIGHTNESS: 0
DIZZINESS: 1
FATIGUE: 1
HEADACHES: 0
ARTHRALGIAS: 1
NAUSEA: 0
APPETITE CHANGE: 0
SHORTNESS OF BREATH: 0
DIARRHEA: 0

## 2025-06-10 ASSESSMENT — PATIENT HEALTH QUESTIONNAIRE - PHQ9
SUM OF ALL RESPONSES TO PHQ9 QUESTIONS 1 AND 2: 2
2. FEELING DOWN, DEPRESSED OR HOPELESS: SEVERAL DAYS
1. LITTLE INTEREST OR PLEASURE IN DOING THINGS: SEVERAL DAYS

## 2025-06-10 ASSESSMENT — ACTIVITIES OF DAILY LIVING (ADL)
GROCERY_SHOPPING: TOTAL CARE
DRESSING: INDEPENDENT
MANAGING_FINANCES: TOTAL CARE
BATHING: INDEPENDENT
DOING_HOUSEWORK: TOTAL CARE
TAKING_MEDICATION: TOTAL CARE

## 2025-06-10 NOTE — PROGRESS NOTES
"Subjective   Reason for Visit: Aracely Benito is an 75 y.o. female here for a Medicare Wellness visit.     Past Medical, Surgical, and Family History reviewed and updated in chart.    Reviewed all medications by prescribing practitioner or clinical pharmacist (such as prescriptions, OTCs, herbal therapies and supplements) and documented in the medical record.    HPI    Patient Care Team:  Pablo Estrella DO as PCP - General  Pablo Estrella DO as PCP - [facility]CO Attributed Provider     Review of Systems   Constitutional:  Positive for fatigue. Negative for appetite change.   Eyes:  Positive for visual disturbance.   Respiratory:  Negative for chest tightness and shortness of breath.    Cardiovascular:  Negative for chest pain and leg swelling.   Gastrointestinal:  Positive for constipation. Negative for diarrhea, nausea and vomiting.   Endocrine: Negative for polydipsia, polyphagia and polyuria.   Genitourinary:  Negative for frequency and urgency.   Musculoskeletal:  Positive for arthralgias, gait problem and myalgias.   Neurological:  Positive for dizziness and weakness. Negative for syncope, numbness and headaches.       Objective   Vitals:  BP 90/52   Pulse 68   Temp 36.1 °C (97 °F)   Resp 18   Ht 1.626 m (5' 4\")   Wt 82.1 kg (181 lb)   SpO2 98%   BMI 31.07 kg/m²       Physical Exam  Constitutional:       Appearance: Normal appearance.   HENT:      Head: Normocephalic.      Right Ear: Tympanic membrane, ear canal and external ear normal.      Left Ear: Tympanic membrane, ear canal and external ear normal.      Nose: Nose normal.      Mouth/Throat:      Mouth: Mucous membranes are moist.      Pharynx: Oropharynx is clear.   Eyes:      Conjunctiva/sclera: Conjunctivae normal.   Cardiovascular:      Rate and Rhythm: Normal rate and regular rhythm.   Pulmonary:      Effort: Pulmonary effort is normal.      Breath sounds: Normal breath sounds.   Abdominal:      Tenderness: There is no abdominal tenderness. "   Musculoskeletal:         General: Normal range of motion.        Arms:       Cervical back: Neck supple.      Comments: Reabsorbing hematoma in area of dialysis graft   Skin:     General: Skin is warm and dry.   Neurological:      General: No focal deficit present.      Mental Status: She is alert and oriented to person, place, and time.      Motor: Weakness present.      Gait: Gait abnormal.   Psychiatric:         Mood and Affect: Mood normal.         Assessment & Plan  Medicare annual wellness visit, subsequent         Type 2 diabetes mellitus without complication, without long-term current use of insulin    Orders:    Follow Up In [facility] - PCP    Referral to Podiatry; Future    carvedilol (Coreg) 6.25 mg tablet; Take 0.5 tablets (3.125 mg) by mouth 2 times a day.    Atrophic vaginitis    Orders:    estradiol (Estrace) 0.01 % (0.1 mg/gram) vaginal cream; Insert 0.5 Applicatorfuls (2 g) into the vagina once daily. Apply to vagina nightly for 1 week then every Monday/Wednesday/Friday.    Nasal congestion    Orders:    fluticasone (Flonase) 50 mcg/actuation nasal spray; Administer 2 sprays into each nostril once daily. Shake gently. Before first use, prime pump. After use, clean tip and replace cap.    Arthritis    Orders:    Disability Placard    Primary hypertension    Orders:    carvedilol (Coreg) 6.25 mg tablet; Take 0.5 tablets (3.125 mg) by mouth 2 times a day.    Seizures (Multi)  Dx reviewed stable

## 2025-06-10 NOTE — ASSESSMENT & PLAN NOTE
Orders:    Follow Up In [facility] - PCP    Referral to Podiatry; Future    carvedilol (Coreg) 6.25 mg tablet; Take 0.5 tablets (3.125 mg) by mouth 2 times a day.

## 2025-06-10 NOTE — ASSESSMENT & PLAN NOTE
Orders:    carvedilol (Coreg) 6.25 mg tablet; Take 0.5 tablets (3.125 mg) by mouth 2 times a day.

## 2025-06-11 ENCOUNTER — DOCUMENTATION (OUTPATIENT)
Facility: HOSPITAL | Age: 75
End: 2025-06-11
Payer: MEDICARE

## 2025-06-11 ENCOUNTER — TELEPHONE (OUTPATIENT)
Facility: HOSPITAL | Age: 75
End: 2025-06-11
Payer: MEDICARE

## 2025-06-11 NOTE — PROGRESS NOTES
Pre-Kidney Intake Questionnaire    1. Do you live in an assisted living/nursing facility? No  2. Do you have difficulty reading or writing in English? No  3. Do you have transportation to and from you medical appointments? Yes.  What type of transportation do you use? Car  4. Do you have a family member or friend who can accompany you to the appointment? Yes  5. Who is your primary care doctor?  Dr. Pablo Estrella  6. Who is your kidney doctor?  Dr. Estrada  7. Are you currently on dialysis? Yes. What type?  Hemodialysis.  What days?  MWF.  How many years?  Sept 2024  8. Do you currently have any open sores or wounds? No  9. Have you ever had an amputation? No  10. Have you ever been diagnosed with cancer? No  11. Do you have a history of heart attack or stroke? No  12. Are you currently wearing oxygen? No  13. Have you been hospitalized for any reason in the last year?  Yes.  Where?  [facility] [place] [facility]- Medication related  Comments Intake complete.

## 2025-06-16 ENCOUNTER — TELEPHONE (OUTPATIENT)
Facility: HOSPITAL | Age: 75
End: 2025-06-16
Payer: MEDICARE

## 2025-06-16 ENCOUNTER — DOCUMENTATION (OUTPATIENT)
Facility: HOSPITAL | Age: 75
End: 2025-06-16
Payer: MEDICARE

## 2025-06-25 ENCOUNTER — TELEPHONE (OUTPATIENT)
Dept: PRIMARY CARE | Facility: CLINIC | Age: 75
End: 2025-06-25
Payer: MEDICARE

## 2025-06-25 NOTE — TELEPHONE ENCOUNTER
----- Message from Pablo Estrella sent at 6/24/2025  1:00 PM EDT -----  See messsage  ----- Message -----  From: Pablo Estrella DO  Sent: 6/24/2025  12:00 AM EDT  To: Pablo Estrella DO    How is bp running with decrease in carvidolol

## 2025-07-08 DIAGNOSIS — F41.9 ANXIETY: ICD-10-CM

## 2025-07-08 RX ORDER — LORAZEPAM 0.5 MG/1
0.25 TABLET ORAL EVERY 6 HOURS PRN
Qty: 15 TABLET | Refills: 0 | Status: CANCELLED | OUTPATIENT
Start: 2025-07-08 | End: 2025-07-18

## 2025-07-08 NOTE — TELEPHONE ENCOUNTER
Patient requests prescription below    Last Office Visit: 6/10/2025   Next Office Visit: 9/2/2025     Requested Prescriptions     Pending Prescriptions Disp Refills    LORazepam (Ativan) 0.5 mg tablet 15 tablet 0     Sig: Take 0.5 tablets (0.25 mg) by mouth every 6 hours if needed for anxiety for up to 10 days.

## 2025-07-09 DIAGNOSIS — F41.9 ANXIETY: ICD-10-CM

## 2025-07-13 RX ORDER — LORAZEPAM 0.5 MG/1
0.25 TABLET ORAL EVERY 6 HOURS PRN
Qty: 15 TABLET | Refills: 1 | Status: SHIPPED | OUTPATIENT
Start: 2025-07-13 | End: 2025-07-26

## 2025-07-30 ENCOUNTER — APPOINTMENT (OUTPATIENT)
Dept: PODIATRY | Facility: CLINIC | Age: 75
End: 2025-07-30
Payer: MEDICARE

## 2025-07-30 DIAGNOSIS — N18.5 TYPE 2 DIABETES MELLITUS WITH STAGE 5 CHRONIC KIDNEY DISEASE NOT ON CHRONIC DIALYSIS, WITHOUT LONG-TERM CURRENT USE OF INSULIN (MULTI): Primary | ICD-10-CM

## 2025-07-30 DIAGNOSIS — E11.22 TYPE 2 DIABETES MELLITUS WITH STAGE 5 CHRONIC KIDNEY DISEASE NOT ON CHRONIC DIALYSIS, WITHOUT LONG-TERM CURRENT USE OF INSULIN (MULTI): Primary | ICD-10-CM

## 2025-07-30 DIAGNOSIS — B35.1 ONYCHOMYCOSIS: ICD-10-CM

## 2025-07-30 PROCEDURE — 1123F ACP DISCUSS/DSCN MKR DOCD: CPT | Performed by: PODIATRIST

## 2025-07-30 PROCEDURE — 99203 OFFICE O/P NEW LOW 30 MIN: CPT | Performed by: PODIATRIST

## 2025-07-30 PROCEDURE — 1160F RVW MEDS BY RX/DR IN RCRD: CPT | Performed by: PODIATRIST

## 2025-07-30 PROCEDURE — 1036F TOBACCO NON-USER: CPT | Performed by: PODIATRIST

## 2025-07-30 PROCEDURE — 1159F MED LIST DOCD IN RCRD: CPT | Performed by: PODIATRIST

## 2025-07-30 NOTE — PROGRESS NOTES
"Chief Complaint   Patient presents with    DM Foot Care   Diabetic foot exam    PCP Pablo Estrella Do  Last visit 06/10/25    History Of Present Illness  Aracely Benito \"Kurtis\" is a 75 y.o. female presenting today for diabetic foot exam.  She states she has rheumatoid arthritis and neuropathy at baseline.  This causes pain especially through the foot.  She also has thickened and discolored nails.  They are wondering how they should manage these.  They present today for evaluation and treatment options     Past Medical History  She has a past medical history of Bipolar disorder, unspecified (Multi), Gout, unspecified, Gout, unspecified, Neuropathy in diabetes (Multi), Pain in unspecified knee (01/22/2016), Personal history of diseases of the skin and subcutaneous tissue, Personal history of other diseases of the digestive system, Personal history of other diseases of the musculoskeletal system and connective tissue (01/22/2016), Personal history of other diseases of the musculoskeletal system and connective tissue, Personal history of other diseases of the musculoskeletal system and connective tissue, Personal history of other diseases of the nervous system and sense organs, Personal history of other diseases of the nervous system and sense organs, Personal history of other diseases of the nervous system and sense organs, Personal history of other diseases of the nervous system and sense organs, Personal history of other endocrine, nutritional and metabolic disease, Personal history of other mental and behavioral disorders (01/22/2016), Personal history of other mental and behavioral disorders, Personal history of other mental and behavioral disorders, Personal history of other specified conditions, Personal history of other specified conditions, Plantar fascial fibromatosis, and Unspecified osteoarthritis, unspecified site.    Surgical History  She has a past surgical history that includes Knee surgery " (03/07/2016); Varicose vein surgery (03/07/2016); Other surgical history (08/17/2019); Other surgical history (08/17/2019); and Foot surgery.     Social History  She reports that she has never smoked. She has never used smokeless tobacco. She reports that she does not currently use alcohol. She reports that she does not currently use drugs.    Family History  Family History[1]     Allergies  Ibuprofen and Latex    Medications  Current Medications[2]    Review of Systems    REVIEW OF SYSTEMS  GENERAL:  Negative for malaise, significant weight loss, fever      Objective:   Vasc: DP and PT pulses are palpable bilateral.  CFT is less than 3 seconds bilateral.  Skin temperature is warm to cool proximal to distal bilateral.      Neuro:  Light touch is intact to the foot bilateral.  Protective sensation is diminished to the foot when tested with the 5.07 SWM bilateral.      Derm: Bilateral hallux and second digit nails are thickened and discolored with subungual debris consistent mycosis.  Remaining nails are clear.  Creases are clean and dry.  No preulcerative calluses.  Varicosities are noted at baseline    Ortho: Bilateral pes planus deformities are noted.    Assessment/Plan     Diagnoses and all orders for this visit:  Type 2 diabetes mellitus with stage 5 chronic kidney disease not on chronic dialysis, without long-term current use of insulin (Multi)  Onychomycosis  Other orders  -     Referral to Podiatry      Type 2 diabetes  The patient was educated on proper diabetic footcare.  They understand the importance of controlling their hemoglobin A1c as this prevents the progression of neuropathy.  Review of the chart and the primary care note does show that they are working on this with the primary care.  The patient understands to inspect their feet daily.  If they notice any changes in their exam they are to call the office immediately     2. Onychomycosis Treatment   Regarding  onychomycosis, we discussed several  treatment options.  These treatments include oral Lamisil, topical Jublia, Topical Tolcylen, laser therapy, local debridement and home remedies.  We discussed the pros and cons of each.  The patient is not a candidate for the oral medication secondary to side effects of liver damage.  I recommended they try over-the-counter modalities first    Serena Bauer DPM       [1]   Family History  Problem Relation Name Age of Onset    Heart failure Mother      Alzheimer's disease Father      Other (Parkinson's disease) Father      Muscular dystrophy Son      Kidney disease Sibling     [2]   Current Outpatient Medications   Medication Sig Dispense Refill    allopurinol (Zyloprim) 100 mg tablet TAKE 1 TABLET BY MOUTH EVERY DAY 30 tablet 3    ASPIRIN ORAL Aspirin 81 MG TABS   Refills: 0       Active      atorvastatin (Lipitor) 20 mg tablet Take 1 tablet (20 mg) by mouth once daily. 90 tablet 3    baclofen (Lioresal) 5 mg tablet Take 0.5 tablets (2.5 mg) by mouth once daily as needed for muscle spasms. 15 tablet 11    bisacodyl (Dulcolax) 10 mg suppository insert 1 Suppository by RECTAL route once daily as needed.      busPIRone (Buspar) 5 mg tablet Take 1 tablet (5 mg) by mouth 2 times a day.      carvedilol (Coreg) 6.25 mg tablet Take 0.5 tablets (3.125 mg) by mouth 2 times a day.      cetirizine (ZyrTEC) 10 mg tablet Take 1 tablet (10 mg) by mouth once daily. 1/2 tablet 3 times a week      darbepoetin deepa (Aranesp, in polysorbate,) Inject 1 mL (100 mcg) under the skin every 14 (fourteen) days.      docusate sodium (Colace) 100 mg capsule Take 1 capsule (100 mg) by mouth 2 times a day. 180 capsule 3    estradiol (Estrace) 0.01 % (0.1 mg/gram) vaginal cream Insert 0.5 Applicatorfuls (2 g) into the vagina once daily. Apply to vagina nightly for 1 week then every Monday/Wednesday/Friday. 42.5 g 5    FeroSuL tablet Take 1 tablet by mouth once daily with breakfast. Do not crush, chew, or split. 90 tablet 3    ferrous sulfate  137 mg (45 mg iron) tablet extended release Take by mouth.      fluticasone (Flonase) 50 mcg/actuation nasal spray Administer 2 sprays into affected nostril(s) once daily.      fluticasone (Flonase) 50 mcg/actuation nasal spray Administer 2 sprays into each nostril once daily. Shake gently. Before first use, prime pump. After use, clean tip and replace cap. 16 g 11    folic acid (Folvite) 1 mg tablet TAKE 1 TABLET BY MOUTH EVERY DAY 90 tablet 3    lactulose 20 gram/30 mL oral solution Take 30 mL (20 g) by mouth.      lamoTRIgine (LaMICtal) 150 mg tablet Take 1.5 tablets (225 mg) by mouth once daily at bedtime.      lamoTRIgine (LaMICtal) 150 mg tablet Take 1 tablet (150 mg) by mouth once daily.      levothyroxine (Synthroid, Levoxyl) 25 mcg tablet TAKE 1 TABLET BY MOUTH ONCE DAILY 90 tablet 3    melatonin-pyridoxine HCl, B6, 3-10 mg tablet Take 1 tablet by mouth once daily at bedtime. 90 tablet 3    multivitamin tablet Take 1 tablet by mouth once daily.      multivitamin with minerals (multivitamin with folic acid) tablet Take by mouth.      nystatin (Mycostatin) cream APPLY 2-3 TIMES DAILY TO AFFECTED AREA(S).      OneTouch Ultra Test strip every 12 (twelve) hours.      pantoprazole (ProtoNix) 40 mg EC tablet Take 1 tablet (40 mg) by mouth 2 times a day. DO NOT CRUSH CHEW OR SPLIT 180 tablet 3    polyethylene glycol (Glycolax) 17 gram/dose powder mix 17 grams in EIGHT ounces of water daily as needed      psyllium (Metamucil) 3.4 gram packet Take 1 packet by mouth once daily.      sodium bicarbonate 650 mg tablet Take 1 tablet (650 mg) by mouth 2 times a day. 180 tablet 3    LORazepam (Ativan) 0.5 mg tablet Take 0.5 tablets (0.25 mg) by mouth every 6 hours if needed for anxiety for up to 10 days. 15 tablet 1    traZODone (Desyrel) 50 mg tablet Take 1 tablet (50 mg) by mouth once daily at bedtime. (Patient taking differently: Take 2 tablets (100 mg) by mouth once daily at bedtime.) 30 tablet 0     No current  facility-administered medications for this visit.

## 2025-09-02 ENCOUNTER — APPOINTMENT (OUTPATIENT)
Dept: PRIMARY CARE | Facility: CLINIC | Age: 75
End: 2025-09-02
Payer: MEDICARE

## 2025-09-03 ASSESSMENT — ENCOUNTER SYMPTOMS
VOMITING: 0
NUMBNESS: 1
DIARRHEA: 0
CHEST TIGHTNESS: 0
FREQUENCY: 0
ARTHRALGIAS: 1
BACK PAIN: 1
DIZZINESS: 1
NAUSEA: 0
POLYPHAGIA: 0
HEADACHES: 0
SHORTNESS OF BREATH: 0
WEAKNESS: 1
MYALGIAS: 1
APPETITE CHANGE: 0
POLYDIPSIA: 0
FATIGUE: 1

## 2025-09-04 ENCOUNTER — TELEPHONE (OUTPATIENT)
Dept: PRIMARY CARE | Facility: CLINIC | Age: 75
End: 2025-09-04
Payer: MEDICARE

## 2025-12-04 ENCOUNTER — APPOINTMENT (OUTPATIENT)
Dept: PRIMARY CARE | Facility: CLINIC | Age: 75
End: 2025-12-04
Payer: MEDICARE